# Patient Record
Sex: MALE | Race: WHITE | Employment: FULL TIME | URBAN - METROPOLITAN AREA
[De-identification: names, ages, dates, MRNs, and addresses within clinical notes are randomized per-mention and may not be internally consistent; named-entity substitution may affect disease eponyms.]

---

## 2017-03-10 RX ORDER — CLONAZEPAM 1 MG/1
TABLET ORAL
Qty: 90 TABLET | Refills: 3 | Status: SHIPPED
Start: 2017-03-10 | End: 2017-09-18

## 2017-03-10 NOTE — TELEPHONE ENCOUNTER
To : The above refill request is for a controlled substance. Please indicate yes or no to refill 30 days supply plus one refill.   If more refills are appropriate, please indicate quantity  The Rx from 2016  after 6 months

## 2017-05-26 RX ORDER — SILODOSIN 8 MG/1
CAPSULE ORAL
Qty: 90 CAPSULE | Refills: 1 | Status: SHIPPED | OUTPATIENT
Start: 2017-05-26 | End: 2017-12-31

## 2017-08-15 ENCOUNTER — TELEPHONE (OUTPATIENT)
Dept: INTERNAL MEDICINE CLINIC | Facility: CLINIC | Age: 52
End: 2017-08-15

## 2017-08-15 DIAGNOSIS — E78.00 HYPERCHOLESTEROLEMIA: ICD-10-CM

## 2017-08-15 DIAGNOSIS — Z12.5 SCREENING FOR PROSTATE CANCER: ICD-10-CM

## 2017-08-15 DIAGNOSIS — I10 BENIGN ESSENTIAL HYPERTENSION: Primary | ICD-10-CM

## 2017-08-15 DIAGNOSIS — Z00.00 ANNUAL PHYSICAL EXAM: ICD-10-CM

## 2017-08-15 NOTE — TELEPHONE ENCOUNTER
Pt has an kelsi for Yearly Physical on 9/27 he would like an order for labs put in system   Tasked to nursing

## 2017-08-25 RX ORDER — CELECOXIB 200 MG/1
CAPSULE ORAL
Qty: 90 CAPSULE | Refills: 3 | Status: SHIPPED | OUTPATIENT
Start: 2017-08-25 | End: 2018-09-09

## 2017-09-18 RX ORDER — CLONAZEPAM 1 MG/1
TABLET ORAL
Qty: 90 TABLET | Refills: 3 | Status: SHIPPED
Start: 2017-09-18 | End: 2018-03-22

## 2017-09-20 ENCOUNTER — APPOINTMENT (OUTPATIENT)
Dept: LAB | Age: 52
End: 2017-09-20
Attending: INTERNAL MEDICINE
Payer: COMMERCIAL

## 2017-09-20 LAB
ALBUMIN SERPL BCP-MCNC: 4.2 G/DL (ref 3.5–4.8)
ALBUMIN/GLOB SERPL: 1.7 {RATIO} (ref 1–2)
ALP SERPL-CCNC: 54 U/L (ref 32–100)
ALT SERPL-CCNC: 19 U/L (ref 17–63)
ANION GAP SERPL CALC-SCNC: 6 MMOL/L (ref 0–18)
AST SERPL-CCNC: 22 U/L (ref 15–41)
BASOPHILS # BLD: 0.1 K/UL (ref 0–0.2)
BASOPHILS NFR BLD: 1 %
BILIRUB SERPL-MCNC: 0.9 MG/DL (ref 0.3–1.2)
BUN SERPL-MCNC: 14 MG/DL (ref 8–20)
BUN/CREAT SERPL: 11.2 (ref 10–20)
CALCIUM SERPL-MCNC: 9.5 MG/DL (ref 8.5–10.5)
CHLORIDE SERPL-SCNC: 106 MMOL/L (ref 95–110)
CHOLEST SERPL-MCNC: 212 MG/DL (ref 110–200)
CO2 SERPL-SCNC: 27 MMOL/L (ref 22–32)
CREAT SERPL-MCNC: 1.25 MG/DL (ref 0.5–1.5)
EOSINOPHIL # BLD: 0.1 K/UL (ref 0–0.7)
EOSINOPHIL NFR BLD: 2 %
ERYTHROCYTE [DISTWIDTH] IN BLOOD BY AUTOMATED COUNT: 13.3 % (ref 11–15)
GLOBULIN PLAS-MCNC: 2.5 G/DL (ref 2.5–3.7)
GLUCOSE SERPL-MCNC: 97 MG/DL (ref 70–99)
HCT VFR BLD AUTO: 41.8 % (ref 41–52)
HDLC SERPL-MCNC: 61 MG/DL
HGB BLD-MCNC: 14.5 G/DL (ref 13.5–17.5)
LDLC SERPL CALC-MCNC: 136 MG/DL (ref 0–99)
LYMPHOCYTES # BLD: 1.5 K/UL (ref 1–4)
LYMPHOCYTES NFR BLD: 27 %
MCH RBC QN AUTO: 33.6 PG (ref 27–32)
MCHC RBC AUTO-ENTMCNC: 34.7 G/DL (ref 32–37)
MCV RBC AUTO: 96.9 FL (ref 80–100)
MONOCYTES # BLD: 0.3 K/UL (ref 0–1)
MONOCYTES NFR BLD: 5 %
NEUTROPHILS # BLD AUTO: 3.6 K/UL (ref 1.8–7.7)
NEUTROPHILS NFR BLD: 65 %
NONHDLC SERPL-MCNC: 151 MG/DL
OSMOLALITY UR CALC.SUM OF ELEC: 288 MOSM/KG (ref 275–295)
PLATELET # BLD AUTO: 213 K/UL (ref 140–400)
PMV BLD AUTO: 8.5 FL (ref 7.4–10.3)
POTASSIUM SERPL-SCNC: 4.7 MMOL/L (ref 3.3–5.1)
PROT SERPL-MCNC: 6.7 G/DL (ref 5.9–8.4)
PSA SERPL-MCNC: 0.4 NG/ML (ref 0–4)
RBC # BLD AUTO: 4.31 M/UL (ref 4.5–5.9)
SODIUM SERPL-SCNC: 139 MMOL/L (ref 136–144)
TRIGL SERPL-MCNC: 76 MG/DL (ref 1–149)
TSH SERPL-ACNC: 5.95 UIU/ML (ref 0.45–5.33)
WBC # BLD AUTO: 5.6 K/UL (ref 4–11)

## 2017-09-20 PROCEDURE — 85025 COMPLETE CBC W/AUTO DIFF WBC: CPT | Performed by: INTERNAL MEDICINE

## 2017-09-20 PROCEDURE — 80053 COMPREHEN METABOLIC PANEL: CPT | Performed by: INTERNAL MEDICINE

## 2017-09-20 PROCEDURE — 84443 ASSAY THYROID STIM HORMONE: CPT | Performed by: INTERNAL MEDICINE

## 2017-09-20 PROCEDURE — 80061 LIPID PANEL: CPT | Performed by: INTERNAL MEDICINE

## 2017-09-27 ENCOUNTER — OFFICE VISIT (OUTPATIENT)
Dept: INTERNAL MEDICINE CLINIC | Facility: CLINIC | Age: 52
End: 2017-09-27

## 2017-09-27 VITALS
TEMPERATURE: 98 F | HEIGHT: 76 IN | DIASTOLIC BLOOD PRESSURE: 84 MMHG | WEIGHT: 222.63 LBS | OXYGEN SATURATION: 98 % | SYSTOLIC BLOOD PRESSURE: 114 MMHG | HEART RATE: 79 BPM | BODY MASS INDEX: 27.11 KG/M2

## 2017-09-27 DIAGNOSIS — D03.8 MELANOMA IN SITU OF OTHER SITE (HCC): ICD-10-CM

## 2017-09-27 DIAGNOSIS — Z00.00 ANNUAL PHYSICAL EXAM: Primary | ICD-10-CM

## 2017-09-27 DIAGNOSIS — N40.1 BENIGN NON-NODULAR PROSTATIC HYPERPLASIA WITH LOWER URINARY TRACT SYMPTOMS: ICD-10-CM

## 2017-09-27 DIAGNOSIS — G25.81 RLS (RESTLESS LEGS SYNDROME): ICD-10-CM

## 2017-09-27 DIAGNOSIS — E78.00 HYPERCHOLESTEROLEMIA: ICD-10-CM

## 2017-09-27 DIAGNOSIS — M54.12 CERVICAL RADICULOPATHY: ICD-10-CM

## 2017-09-27 DIAGNOSIS — F51.01 PRIMARY INSOMNIA: ICD-10-CM

## 2017-09-27 PROBLEM — D03.9 MELANOMA IN SITU (HCC): Status: ACTIVE | Noted: 2017-09-27

## 2017-09-27 PROCEDURE — 99396 PREV VISIT EST AGE 40-64: CPT | Performed by: INTERNAL MEDICINE

## 2017-09-27 RX ORDER — PRAMIPEXOLE DIHYDROCHLORIDE 0.12 MG/1
TABLET ORAL
Qty: 21 TABLET | Refills: 0 | Status: SHIPPED | OUTPATIENT
Start: 2017-09-27 | End: 2018-03-16

## 2017-09-27 RX ORDER — PRAMIPEXOLE DIHYDROCHLORIDE 0.5 MG/1
0.5 TABLET ORAL NIGHTLY
Qty: 90 TABLET | Refills: 3 | Status: SHIPPED | OUTPATIENT
Start: 2017-09-27 | End: 2018-03-16

## 2017-09-27 NOTE — PROGRESS NOTES
Aviva Todd is a 47year old male. HPI:   Patient presents with:  Physical: annual PE  Medication Follow-Up: questiond/concerns about Lorazepam for restless leg syndrome. He Doesn't think it's helping anymore.       48 y/o M here for physical e External Cream Apply 1 Application topically as needed.  Disp:  Rfl: 2   NASONEX 50 MCG/ACT Nasal Suspension SPRAY 2 SPRAYS INTO BOTH NOSTRILS DAILY AS NEEDED Disp: 1 Inhaler Rfl: 1       Allergies:  No Known Allergies            ROS:   Constitutional: no w exam, annual  PSA normal at 0.4 in Sept 2017; sees dermatologist Dr Charity Blanton 3-4x/year for MARTHA; pt seen by GI Dr Ale Pro for screening colonoscopy in Nov 2016; he colonoscopy revealed one hyperplastic polyp; based on his history, the patient was advised to

## 2017-11-01 ENCOUNTER — HOSPITAL ENCOUNTER (OUTPATIENT)
Dept: CV DIAGNOSTICS | Facility: HOSPITAL | Age: 52
Discharge: HOME | End: 2017-11-01
Attending: INTERNAL MEDICINE
Payer: COMMERCIAL

## 2017-11-01 DIAGNOSIS — R94.31 ABNORMAL EKG: ICD-10-CM

## 2017-11-01 DIAGNOSIS — Z01.818 PREOP EXAMINATION: ICD-10-CM

## 2017-11-01 PROCEDURE — 93016 CV STRESS TEST SUPVJ ONLY: CPT | Performed by: INTERNAL MEDICINE

## 2017-11-01 PROCEDURE — 93017 CV STRESS TEST TRACING ONLY: CPT | Performed by: INTERNAL MEDICINE

## 2017-11-01 PROCEDURE — 93350 STRESS TTE ONLY: CPT | Performed by: INTERNAL MEDICINE

## 2017-11-01 PROCEDURE — 93018 CV STRESS TEST I&R ONLY: CPT | Performed by: INTERNAL MEDICINE

## 2018-01-02 RX ORDER — SILODOSIN 8 MG/1
CAPSULE ORAL
Qty: 90 CAPSULE | Refills: 3 | Status: SHIPPED | OUTPATIENT
Start: 2018-01-02 | End: 2018-12-11

## 2018-03-16 ENCOUNTER — OFFICE VISIT (OUTPATIENT)
Dept: INTERNAL MEDICINE CLINIC | Facility: CLINIC | Age: 53
End: 2018-03-16

## 2018-03-16 VITALS
WEIGHT: 220 LBS | SYSTOLIC BLOOD PRESSURE: 130 MMHG | DIASTOLIC BLOOD PRESSURE: 76 MMHG | TEMPERATURE: 98 F | OXYGEN SATURATION: 98 % | HEIGHT: 76 IN | HEART RATE: 60 BPM | BODY MASS INDEX: 26.79 KG/M2

## 2018-03-16 DIAGNOSIS — J01.10 ACUTE FRONTAL SINUSITIS, RECURRENCE NOT SPECIFIED: Primary | ICD-10-CM

## 2018-03-16 PROCEDURE — 99213 OFFICE O/P EST LOW 20 MIN: CPT | Performed by: INTERNAL MEDICINE

## 2018-03-16 PROCEDURE — 99212 OFFICE O/P EST SF 10 MIN: CPT | Performed by: INTERNAL MEDICINE

## 2018-03-16 RX ORDER — CEFUROXIME AXETIL 500 MG/1
500 TABLET ORAL 2 TIMES DAILY
Qty: 28 TABLET | Refills: 0 | Status: SHIPPED | OUTPATIENT
Start: 2018-03-16 | End: 2018-09-14

## 2018-03-16 NOTE — PROGRESS NOTES
Xenia Molina is a 47year old male. HPI:   Patient presents with:  Sinus Problem: Pt here today for facial pain, congestion/stuffy nose, was blowing out green-bloody mucus which since has subsided. URI / Head Cold that started 11 days ago.  Tried N Allergies              ROS:   Constitutional: no weight loss; no fatigue  Cardiovascular:  Negative for chest pain; negative palpitations  Respiratory:  Negative for cough, dyspnea and wheezing  Gastrointestinal:  Negative for abdominal pain, constipation,

## 2018-03-22 NOTE — TELEPHONE ENCOUNTER
To MD:  The above refill request is for a controlled substance. Please indicate yes or no to refill 30 days supply plus one refill.   If more refills are appropriate, please indicate quantity      Last refilled 9/18/2017 # 90/3

## 2018-03-23 RX ORDER — CLONAZEPAM 1 MG/1
TABLET ORAL
Qty: 90 TABLET | Refills: 3 | Status: SHIPPED
Start: 2018-03-23 | End: 2018-09-24

## 2018-04-05 ENCOUNTER — TELEPHONE (OUTPATIENT)
Dept: INTERNAL MEDICINE CLINIC | Facility: CLINIC | Age: 53
End: 2018-04-05

## 2018-04-05 NOTE — TELEPHONE ENCOUNTER
Pt. Still has a sinus infection & headache he wants to know if he can be prescribed something he completed two round of antibiotics.  Please advise  Ph. # [phone]    [facility] # [phone]    Routed to clinical

## 2018-04-05 NOTE — TELEPHONE ENCOUNTER
Please advise - called patient who still has sinus problems ( he completed 2 rounds of antibiotics. He has a headache , pressure behind right eye and in his ear. Last week while flying the ear was very painful, denies other SX , denies fever - to DR. MITTAL

## 2018-04-06 RX ORDER — SULFAMETHOXAZOLE AND TRIMETHOPRIM 800; 160 MG/1; MG/1
1 TABLET ORAL 2 TIMES DAILY
Qty: 20 TABLET | Refills: 0 | Status: SHIPPED | OUTPATIENT
Start: 2018-04-06 | End: 2018-09-14

## 2018-07-12 ENCOUNTER — OFFICE VISIT (OUTPATIENT)
Dept: INTERNAL MEDICINE CLINIC | Facility: CLINIC | Age: 53
End: 2018-07-12

## 2018-07-12 VITALS
HEART RATE: 78 BPM | WEIGHT: 225 LBS | RESPIRATION RATE: 18 BRPM | SYSTOLIC BLOOD PRESSURE: 126 MMHG | HEIGHT: 76 IN | OXYGEN SATURATION: 97 % | DIASTOLIC BLOOD PRESSURE: 70 MMHG | TEMPERATURE: 98 F | BODY MASS INDEX: 27.4 KG/M2

## 2018-07-12 DIAGNOSIS — F51.01 PRIMARY INSOMNIA: ICD-10-CM

## 2018-07-12 DIAGNOSIS — G25.81 RLS (RESTLESS LEGS SYNDROME): ICD-10-CM

## 2018-07-12 DIAGNOSIS — F41.9 ANXIETY: ICD-10-CM

## 2018-07-12 DIAGNOSIS — R53.83 OTHER FATIGUE: Primary | ICD-10-CM

## 2018-07-12 PROCEDURE — 99212 OFFICE O/P EST SF 10 MIN: CPT | Performed by: INTERNAL MEDICINE

## 2018-07-12 PROCEDURE — 99214 OFFICE O/P EST MOD 30 MIN: CPT | Performed by: INTERNAL MEDICINE

## 2018-07-12 RX ORDER — DIAZEPAM 5 MG/1
5 TABLET ORAL 2 TIMES DAILY PRN
Qty: 60 TABLET | Refills: 3 | Status: SHIPPED
Start: 2018-07-12

## 2018-07-12 NOTE — PROGRESS NOTES
Darnell Manriquez is a 47year old male.     HPI:   Patient presents with:  Medication Request: questions regarding Clonazepam and possible change on rx      48 y/o M here with c/o +fatigue and anxiety; has had intermittent panic-related anxiety; changing Cefuroxime Axetil 500 MG Oral Tab Take 1 tablet (500 mg total) by mouth 2 (two) times daily.  Disp: 28 tablet Rfl: 0   NASONEX 50 MCG/ACT Nasal Suspension SPRAY 2 SPRAYS INTO BOTH NOSTRILS DAILY AS NEEDED Disp: 1 Inhaler Rfl: 1       Allergies:  No Known Nasonex NS 2 sp EN qD     Hypercholesteremia   in Sept 2017; advise diet- control     Melanoma  S/p excision to left lower back in Aug 2016; F/U dermatologist Dr Kendrick Gill q 3 mos     PACs  ECHO 55-60% in April 2016; saw EP Dr Janette Elkins     RBBB  EKG to

## 2018-08-30 ENCOUNTER — PATIENT MESSAGE (OUTPATIENT)
Dept: INTERNAL MEDICINE CLINIC | Facility: CLINIC | Age: 53
End: 2018-08-30

## 2018-08-30 DIAGNOSIS — E29.1 HYPOGONADISM IN MALE: Primary | ICD-10-CM

## 2018-08-30 DIAGNOSIS — Z00.00 PHYSICAL EXAM, ANNUAL: ICD-10-CM

## 2018-08-30 DIAGNOSIS — Z12.5 SCREENING PSA (PROSTATE SPECIFIC ANTIGEN): ICD-10-CM

## 2018-08-30 NOTE — TELEPHONE ENCOUNTER
From: Terrance Hamm  To: Deya Rea MD  Sent: 8/30/2018 11:17 AM CDT  Subject: Non-Urgent Medical Question    I would like to have my testosterone level checked in my upcoming physical exam. Is that part of the normal blood test or do I need t

## 2018-09-09 RX ORDER — CELECOXIB 200 MG/1
CAPSULE ORAL
Qty: 90 CAPSULE | Refills: 3 | Status: SHIPPED | OUTPATIENT
Start: 2018-09-09 | End: 2019-08-22

## 2018-09-14 RX ORDER — PRAMIPEXOLE DIHYDROCHLORIDE 0.5 MG/1
0.5 TABLET ORAL NIGHTLY
Qty: 90 TABLET | Refills: 3 | OUTPATIENT
Start: 2018-09-14

## 2018-09-14 NOTE — TELEPHONE ENCOUNTER
Pt is no longer on med  Refill request has failed the Ambulatory Medication Refill Standing Order and is routed to the primary physician to review the following:

## 2018-09-19 ENCOUNTER — LAB ENCOUNTER (OUTPATIENT)
Dept: LAB | Age: 53
End: 2018-09-19
Attending: INTERNAL MEDICINE
Payer: COMMERCIAL

## 2018-09-19 DIAGNOSIS — Z00.00 PHYSICAL EXAM, ANNUAL: ICD-10-CM

## 2018-09-19 DIAGNOSIS — Z12.5 SCREENING PSA (PROSTATE SPECIFIC ANTIGEN): ICD-10-CM

## 2018-09-19 DIAGNOSIS — E29.1 HYPOGONADISM IN MALE: ICD-10-CM

## 2018-09-19 LAB
ALBUMIN SERPL BCP-MCNC: 4.1 G/DL (ref 3.5–4.8)
ALBUMIN/GLOB SERPL: 2.1 {RATIO} (ref 1–2)
ALP SERPL-CCNC: 40 U/L (ref 32–100)
ALT SERPL-CCNC: 24 U/L (ref 17–63)
ANION GAP SERPL CALC-SCNC: 6 MMOL/L (ref 0–18)
AST SERPL-CCNC: 24 U/L (ref 15–41)
BASOPHILS # BLD: 0 K/UL (ref 0–0.2)
BASOPHILS NFR BLD: 1 %
BILIRUB SERPL-MCNC: 1.2 MG/DL (ref 0.3–1.2)
BUN SERPL-MCNC: 12 MG/DL (ref 8–20)
BUN/CREAT SERPL: 10.3 (ref 10–20)
CALCIUM SERPL-MCNC: 9.2 MG/DL (ref 8.5–10.5)
CHLORIDE SERPL-SCNC: 107 MMOL/L (ref 95–110)
CHOLEST SERPL-MCNC: 207 MG/DL (ref 110–200)
CO2 SERPL-SCNC: 26 MMOL/L (ref 22–32)
CREAT SERPL-MCNC: 1.17 MG/DL (ref 0.5–1.5)
EOSINOPHIL # BLD: 0.1 K/UL (ref 0–0.7)
EOSINOPHIL NFR BLD: 3 %
ERYTHROCYTE [DISTWIDTH] IN BLOOD BY AUTOMATED COUNT: 14 % (ref 11–15)
GLOBULIN PLAS-MCNC: 2 G/DL (ref 2.5–3.7)
GLUCOSE SERPL-MCNC: 90 MG/DL (ref 70–99)
HCT VFR BLD AUTO: 41 % (ref 41–52)
HDLC SERPL-MCNC: 53 MG/DL
HGB BLD-MCNC: 14.3 G/DL (ref 13.5–17.5)
LDLC SERPL CALC-MCNC: 140 MG/DL (ref 0–99)
LYMPHOCYTES # BLD: 1.4 K/UL (ref 1–4)
LYMPHOCYTES NFR BLD: 36 %
MCH RBC QN AUTO: 34 PG (ref 27–32)
MCHC RBC AUTO-ENTMCNC: 34.9 G/DL (ref 32–37)
MCV RBC AUTO: 97.5 FL (ref 80–100)
MONOCYTES # BLD: 0.3 K/UL (ref 0–1)
MONOCYTES NFR BLD: 8 %
NEUTROPHILS # BLD AUTO: 2.1 K/UL (ref 1.8–7.7)
NEUTROPHILS NFR BLD: 53 %
NONHDLC SERPL-MCNC: 154 MG/DL
OSMOLALITY UR CALC.SUM OF ELEC: 287 MOSM/KG (ref 275–295)
PATIENT FASTING: YES
PLATELET # BLD AUTO: 215 K/UL (ref 140–400)
PMV BLD AUTO: 8.9 FL (ref 7.4–10.3)
POTASSIUM SERPL-SCNC: 4.3 MMOL/L (ref 3.3–5.1)
PROT SERPL-MCNC: 6.1 G/DL (ref 5.9–8.4)
PSA SERPL-MCNC: 0.4 NG/ML (ref 0–4)
RBC # BLD AUTO: 4.2 M/UL (ref 4.5–5.9)
SODIUM SERPL-SCNC: 139 MMOL/L (ref 136–144)
TESTOST SERPL-MCNC: 519 NG/DL (ref 175–781)
TRIGL SERPL-MCNC: 68 MG/DL (ref 1–149)
TSH SERPL-ACNC: 3.8 UIU/ML (ref 0.45–5.33)
WBC # BLD AUTO: 4 K/UL (ref 4–11)

## 2018-09-19 PROCEDURE — 80053 COMPREHEN METABOLIC PANEL: CPT

## 2018-09-19 PROCEDURE — 84443 ASSAY THYROID STIM HORMONE: CPT

## 2018-09-19 PROCEDURE — 36415 COLL VENOUS BLD VENIPUNCTURE: CPT

## 2018-09-19 PROCEDURE — 84403 ASSAY OF TOTAL TESTOSTERONE: CPT

## 2018-09-19 PROCEDURE — 80061 LIPID PANEL: CPT

## 2018-09-19 PROCEDURE — 85025 COMPLETE CBC W/AUTO DIFF WBC: CPT

## 2018-09-25 NOTE — TELEPHONE ENCOUNTER
To MD:  The above refill request is for a controlled substance. Please indicate yes or no to refill 30 days supply plus one refill.   If more refills are appropriate, please indicate quantity    Last refilled- 3/23/2018 # 90/1

## 2018-09-27 RX ORDER — CLONAZEPAM 1 MG/1
TABLET ORAL
Qty: 90 TABLET | Refills: 2 | Status: SHIPPED
Start: 2018-09-27 | End: 2019-03-20

## 2018-10-26 ENCOUNTER — OFFICE VISIT (OUTPATIENT)
Dept: INTERNAL MEDICINE CLINIC | Facility: CLINIC | Age: 53
End: 2018-10-26
Payer: COMMERCIAL

## 2018-10-26 VITALS
SYSTOLIC BLOOD PRESSURE: 104 MMHG | TEMPERATURE: 99 F | BODY MASS INDEX: 27.4 KG/M2 | HEIGHT: 76 IN | WEIGHT: 225 LBS | DIASTOLIC BLOOD PRESSURE: 80 MMHG | HEART RATE: 76 BPM

## 2018-10-26 DIAGNOSIS — G25.81 RLS (RESTLESS LEGS SYNDROME): ICD-10-CM

## 2018-10-26 DIAGNOSIS — I45.10 RBBB: ICD-10-CM

## 2018-10-26 DIAGNOSIS — I49.1 PAC (PREMATURE ATRIAL CONTRACTION): ICD-10-CM

## 2018-10-26 DIAGNOSIS — J30.89 ALLERGIC RHINITIS DUE TO OTHER ALLERGIC TRIGGER, UNSPECIFIED SEASONALITY: ICD-10-CM

## 2018-10-26 DIAGNOSIS — M17.12 LOCALIZED OSTEOARTHRITIS OF LEFT KNEE: ICD-10-CM

## 2018-10-26 DIAGNOSIS — E78.00 HYPERCHOLESTEREMIA: ICD-10-CM

## 2018-10-26 DIAGNOSIS — D03.8 MELANOMA IN SITU OF OTHER SITE (HCC): ICD-10-CM

## 2018-10-26 DIAGNOSIS — F41.9 ANXIETY: ICD-10-CM

## 2018-10-26 DIAGNOSIS — Z00.00 PHYSICAL EXAM, ANNUAL: Primary | ICD-10-CM

## 2018-10-26 DIAGNOSIS — M54.12 CERVICAL RADICULOPATHY: ICD-10-CM

## 2018-10-26 DIAGNOSIS — N40.1 BENIGN NON-NODULAR PROSTATIC HYPERPLASIA WITH LOWER URINARY TRACT SYMPTOMS: ICD-10-CM

## 2018-10-26 DIAGNOSIS — F51.01 PRIMARY INSOMNIA: ICD-10-CM

## 2018-10-26 PROCEDURE — 90686 IIV4 VACC NO PRSV 0.5 ML IM: CPT | Performed by: INTERNAL MEDICINE

## 2018-10-26 PROCEDURE — 90715 TDAP VACCINE 7 YRS/> IM: CPT | Performed by: INTERNAL MEDICINE

## 2018-10-26 PROCEDURE — 90472 IMMUNIZATION ADMIN EACH ADD: CPT | Performed by: INTERNAL MEDICINE

## 2018-10-26 PROCEDURE — 99396 PREV VISIT EST AGE 40-64: CPT | Performed by: INTERNAL MEDICINE

## 2018-10-26 PROCEDURE — 90471 IMMUNIZATION ADMIN: CPT | Performed by: INTERNAL MEDICINE

## 2018-10-26 RX ORDER — NITROGLYCERIN 2 %
1 OINTMENT (GRAM) TRANSDERMAL 2 TIMES DAILY
Refills: 1 | COMMUNITY
Start: 2018-08-24

## 2018-10-26 NOTE — PROGRESS NOTES
Roly Taveras is a 47year old male.     HPI:   Patient presents with:  Physical      46 y/o M here for physical exam; has had occasional right sinus headaches;  no CP; no SOB; no headaches; no palpitation; anxiety has lessened with less lob stress DAILY AS NEEDED Disp: 1 Inhaler Rfl: 1       Allergies:  No Known Allergies            ROS:   Constitutional: no weight loss; no fatigue  ENMT:  Negative for ear drainage, hearing loss and nasal drainage  Eyes:  Negative for eye discharge and vision loss colonoscopy revealed one hyperplastic polyp; based on his history, the patient was advised to repeat colonoscopy in 5 years (or Nov 2021) due to the poor - fair quality of his bowel prep;  Tdap today, and flu vax today    Anxiety  Does not want SSRI; on stephanie

## 2018-12-13 RX ORDER — SILODOSIN 8 MG/1
CAPSULE ORAL
Qty: 90 CAPSULE | Refills: 3 | Status: SHIPPED | OUTPATIENT
Start: 2018-12-13 | End: 2019-11-08

## 2018-12-13 NOTE — TELEPHONE ENCOUNTER
Refill request is for a maintenance medication and has met the criteria specified in the Ambulatory Medication Refill Standing Order for eligibility, visits, laboratory, alerts and was sent to the requested pharmacy.     Requested Prescriptions     Pending

## 2019-03-19 ENCOUNTER — TELEPHONE (OUTPATIENT)
Dept: INTERNAL MEDICINE CLINIC | Facility: CLINIC | Age: 54
End: 2019-03-19

## 2019-03-19 NOTE — TELEPHONE ENCOUNTER
Patient is due for a refill for Clonazepam 1mg  - daily - #90 on 3/25. He is leaving for out of town on 3/23 - needs this done by 3/22, so pharmacist said he needed our approval for early fill.     [facility]

## 2019-03-20 RX ORDER — CLONAZEPAM 1 MG/1
TABLET ORAL
Qty: 90 TABLET | Refills: 3 | Status: SHIPPED
Start: 2019-03-20 | End: 2019-09-18

## 2019-03-21 NOTE — TELEPHONE ENCOUNTER
Imp- restless leg syndrome; Rec- refilled clonazepam 1 mg po qHS; #90, 3RF; may refill early;  Rx FAXed; please notify pt Rx was FAXed

## 2019-05-15 ENCOUNTER — OFFICE VISIT (OUTPATIENT)
Dept: INTERNAL MEDICINE CLINIC | Facility: CLINIC | Age: 54
End: 2019-05-15
Payer: COMMERCIAL

## 2019-05-15 VITALS
HEIGHT: 76 IN | TEMPERATURE: 98 F | WEIGHT: 225 LBS | DIASTOLIC BLOOD PRESSURE: 84 MMHG | OXYGEN SATURATION: 98 % | HEART RATE: 66 BPM | SYSTOLIC BLOOD PRESSURE: 124 MMHG | BODY MASS INDEX: 27.4 KG/M2

## 2019-05-15 DIAGNOSIS — N40.1 BENIGN NON-NODULAR PROSTATIC HYPERPLASIA WITH LOWER URINARY TRACT SYMPTOMS: ICD-10-CM

## 2019-05-15 DIAGNOSIS — M17.12 LOCALIZED OSTEOARTHRITIS OF LEFT KNEE: ICD-10-CM

## 2019-05-15 DIAGNOSIS — G43.809 OTHER MIGRAINE WITHOUT STATUS MIGRAINOSUS, NOT INTRACTABLE: Primary | ICD-10-CM

## 2019-05-15 DIAGNOSIS — G25.81 RLS (RESTLESS LEGS SYNDROME): ICD-10-CM

## 2019-05-15 PROCEDURE — 99214 OFFICE O/P EST MOD 30 MIN: CPT | Performed by: INTERNAL MEDICINE

## 2019-05-15 PROCEDURE — 99212 OFFICE O/P EST SF 10 MIN: CPT | Performed by: INTERNAL MEDICINE

## 2019-05-15 RX ORDER — SUMATRIPTAN 50 MG/1
50 TABLET, FILM COATED ORAL EVERY 2 HOUR PRN
Qty: 9 TABLET | Refills: 5 | Status: SHIPPED | OUTPATIENT
Start: 2019-05-15 | End: 2019-09-18

## 2019-05-15 NOTE — PROGRESS NOTES
Lamonte Acuña is a 47year old male. HPI:   Patient presents with:  Headache: Patient reports he has been getting headaches for the past couple of years and they have increased in frequency. He thinks they may be migraines.  He states they are all t Alcohol use: Yes      Comment: occasionally.  3-4 drinks a month    Drug use: No       Medications (Active prior to today's visit):    Current Outpatient Medications:  SUMAtriptan Succinate 50 MG Oral Tab Take 1 tablet (50 mg total) by mouth every 2 (two) retro-orbital headaches with +photsensitivity and +throbbing quality; suspect migraine variant; advise trail sumatriptan 50 mg po qD prn; if fails to respond, would consider MRA brain    Health Maintenance  PSA normal at 0.4 in YBON 2051; sees dermatologis

## 2019-08-22 ENCOUNTER — TELEPHONE (OUTPATIENT)
Dept: INTERNAL MEDICINE CLINIC | Facility: CLINIC | Age: 54
End: 2019-08-22

## 2019-08-22 DIAGNOSIS — Z00.00 ANNUAL PHYSICAL EXAM: Primary | ICD-10-CM

## 2019-08-22 RX ORDER — CELECOXIB 200 MG/1
CAPSULE ORAL
Qty: 90 CAPSULE | Refills: 0 | Status: SHIPPED | OUTPATIENT
Start: 2019-08-22 | End: 2019-11-08

## 2019-08-23 NOTE — TELEPHONE ENCOUNTER
Pt is scheduled on Nov 8 for a yearly physical pt is requesting a lab order be entered in system   Tasked to nursing

## 2019-09-19 RX ORDER — CLONAZEPAM 1 MG/1
TABLET ORAL
Qty: 90 TABLET | Refills: 3 | Status: SHIPPED | OUTPATIENT
Start: 2019-09-19 | End: 2020-06-14

## 2019-09-19 RX ORDER — SUMATRIPTAN 50 MG/1
TABLET, FILM COATED ORAL
Qty: 9 TABLET | Refills: 5 | Status: SHIPPED | OUTPATIENT
Start: 2019-09-19 | End: 2020-02-24

## 2019-09-19 NOTE — TELEPHONE ENCOUNTER
To Dr. Reginald Jett - see pended. Spoke with pharmacy - pt has used all refills for sumatriptan - increase #? RF? Clonazepam expires after 6 months. To MD:  The above refill request is for a controlled substance. Please review pended medication order.

## 2019-11-04 ENCOUNTER — LAB ENCOUNTER (OUTPATIENT)
Dept: LAB | Age: 54
End: 2019-11-04
Attending: INTERNAL MEDICINE
Payer: COMMERCIAL

## 2019-11-04 DIAGNOSIS — Z00.00 ANNUAL PHYSICAL EXAM: ICD-10-CM

## 2019-11-04 PROCEDURE — 84443 ASSAY THYROID STIM HORMONE: CPT

## 2019-11-04 PROCEDURE — 80061 LIPID PANEL: CPT

## 2019-11-04 PROCEDURE — 36415 COLL VENOUS BLD VENIPUNCTURE: CPT

## 2019-11-04 PROCEDURE — 80053 COMPREHEN METABOLIC PANEL: CPT

## 2019-11-04 PROCEDURE — 85025 COMPLETE CBC W/AUTO DIFF WBC: CPT

## 2019-11-04 PROCEDURE — 84439 ASSAY OF FREE THYROXINE: CPT

## 2019-11-04 NOTE — TELEPHONE ENCOUNTER
Patient presented for labs. No orders in the system. Lab orders entered per protocol.     MARLYN Brizuela Notice

## 2019-11-08 ENCOUNTER — OFFICE VISIT (OUTPATIENT)
Dept: INTERNAL MEDICINE CLINIC | Facility: CLINIC | Age: 54
End: 2019-11-08
Payer: COMMERCIAL

## 2019-11-08 VITALS
RESPIRATION RATE: 16 BRPM | TEMPERATURE: 98 F | WEIGHT: 230 LBS | DIASTOLIC BLOOD PRESSURE: 78 MMHG | BODY MASS INDEX: 28.01 KG/M2 | OXYGEN SATURATION: 98 % | SYSTOLIC BLOOD PRESSURE: 118 MMHG | HEART RATE: 64 BPM | HEIGHT: 76 IN

## 2019-11-08 DIAGNOSIS — F41.9 ANXIETY: ICD-10-CM

## 2019-11-08 DIAGNOSIS — G25.81 RLS (RESTLESS LEGS SYNDROME): ICD-10-CM

## 2019-11-08 DIAGNOSIS — I49.1 PAC (PREMATURE ATRIAL CONTRACTION): ICD-10-CM

## 2019-11-08 DIAGNOSIS — R51.9 HEADACHE DISORDER: ICD-10-CM

## 2019-11-08 DIAGNOSIS — N40.1 BENIGN NON-NODULAR PROSTATIC HYPERPLASIA WITH LOWER URINARY TRACT SYMPTOMS: ICD-10-CM

## 2019-11-08 DIAGNOSIS — J30.89 ALLERGIC RHINITIS DUE TO OTHER ALLERGIC TRIGGER, UNSPECIFIED SEASONALITY: ICD-10-CM

## 2019-11-08 DIAGNOSIS — D03.8 MELANOMA IN SITU OF OTHER SITE (HCC): ICD-10-CM

## 2019-11-08 DIAGNOSIS — F51.01 PRIMARY INSOMNIA: ICD-10-CM

## 2019-11-08 DIAGNOSIS — Z00.00 ANNUAL PHYSICAL EXAM: Primary | ICD-10-CM

## 2019-11-08 DIAGNOSIS — I45.10 RBBB: ICD-10-CM

## 2019-11-08 DIAGNOSIS — M17.12 LOCALIZED OSTEOARTHRITIS OF LEFT KNEE: ICD-10-CM

## 2019-11-08 DIAGNOSIS — E78.00 HYPERCHOLESTEREMIA: ICD-10-CM

## 2019-11-08 PROCEDURE — 90686 IIV4 VACC NO PRSV 0.5 ML IM: CPT | Performed by: INTERNAL MEDICINE

## 2019-11-08 PROCEDURE — 90471 IMMUNIZATION ADMIN: CPT | Performed by: INTERNAL MEDICINE

## 2019-11-08 PROCEDURE — 99396 PREV VISIT EST AGE 40-64: CPT | Performed by: INTERNAL MEDICINE

## 2019-11-08 RX ORDER — SILODOSIN 8 MG/1
8 CAPSULE ORAL
Qty: 90 CAPSULE | Refills: 3 | Status: SHIPPED | OUTPATIENT
Start: 2019-11-08 | End: 2020-12-14

## 2019-11-08 RX ORDER — CELECOXIB 200 MG/1
200 CAPSULE ORAL
Qty: 90 CAPSULE | Refills: 3 | Status: SHIPPED | OUTPATIENT
Start: 2019-11-08

## 2019-11-08 NOTE — PROGRESS NOTES
Moriah Kulkarni is a 49year old male.     HPI:   Patient presents with:  Physical: Annual Px      49 yg/o M here for physical exam; has been taking sumatriptan 50 mg po qD prn headache; has been  taking 1-2x per month;  no CP; no SOB; no headaches; no p Application topically 2 (two) times daily. 1   • diazepam 5 MG Oral Tab Take 1 tablet (5 mg total) by mouth 2 (two) times daily as needed for Anxiety.  60 tablet 3   • NASONEX 50 MCG/ACT Nasal Suspension SPRAY 2 SPRAYS INTO BOTH NOSTRILS DAILY AS NEEDED 1 and proprioception intact  Skin: no rash or ulcerations         ASSESSMENT/PLAN:   Physical exam   PSA normal at 0.5 in PIM 1784; sees dermatologist Dr Sharyn Morrow 3-4x/year for MARTHA; pt seen by GI Dr Migdalia Duncan for screening colonoscopy in Nov 2016; he colonosco FREE 0.5 ML     11/8/2019  Cher Philip MD

## 2019-11-17 RX ORDER — CELECOXIB 200 MG/1
CAPSULE ORAL
Qty: 90 CAPSULE | Refills: 0 | OUTPATIENT
Start: 2019-11-17

## 2019-11-18 NOTE — TELEPHONE ENCOUNTER
Refill request is for a maintenance medication and has met the criteria specified in the Ambulatory Medication Refill Standing Order for eligibility, visits, laboratory, alerts and was sent to the requested pharmacy.     Requested Prescriptions     Refused

## 2019-11-19 NOTE — TELEPHONE ENCOUNTER
Pt does not remember getting a paper RX  He does need a refill &   [facility]

## 2019-11-20 RX ORDER — CELECOXIB 200 MG/1
CAPSULE ORAL
Qty: 90 CAPSULE | Refills: 3 | Status: SHIPPED | OUTPATIENT
Start: 2019-11-20 | End: 2020-12-04

## 2020-01-28 RX ORDER — SUMATRIPTAN 50 MG/1
TABLET, FILM COATED ORAL
Qty: 9 TABLET | Refills: 5 | OUTPATIENT
Start: 2020-01-28

## 2020-01-28 NOTE — TELEPHONE ENCOUNTER
Current refill request refused due to refill is either a duplicate request or has active refills at the pharmacy. Check previous templates.     Requested Prescriptions     Refused Prescriptions Disp Refills   • SUMATRIPTAN SUCCINATE 50 MG Oral Tab [Pharmac

## 2020-02-24 RX ORDER — SUMATRIPTAN 50 MG/1
TABLET, FILM COATED ORAL
Qty: 9 TABLET | Refills: 5 | Status: SHIPPED | OUTPATIENT
Start: 2020-02-24 | End: 2021-02-25

## 2020-06-13 ENCOUNTER — TELEPHONE (OUTPATIENT)
Dept: INTERNAL MEDICINE CLINIC | Facility: CLINIC | Age: 55
End: 2020-06-13

## 2020-06-15 RX ORDER — CLONAZEPAM 1 MG/1
TABLET ORAL
Qty: 90 TABLET | Refills: 1 | Status: SHIPPED | OUTPATIENT
Start: 2020-06-15 | End: 2020-12-14

## 2020-10-14 ENCOUNTER — IMMUNIZATION (OUTPATIENT)
Dept: INTERNAL MEDICINE CLINIC | Facility: CLINIC | Age: 55
End: 2020-10-14
Payer: COMMERCIAL

## 2020-10-14 DIAGNOSIS — Z23 NEED FOR VACCINATION: ICD-10-CM

## 2020-10-14 PROCEDURE — 90686 IIV4 VACC NO PRSV 0.5 ML IM: CPT | Performed by: INTERNAL MEDICINE

## 2020-10-14 PROCEDURE — 90471 IMMUNIZATION ADMIN: CPT | Performed by: INTERNAL MEDICINE

## 2020-12-01 ENCOUNTER — TELEPHONE (OUTPATIENT)
Dept: INTERNAL MEDICINE CLINIC | Facility: CLINIC | Age: 55
End: 2020-12-01

## 2020-12-03 NOTE — TELEPHONE ENCOUNTER
Patient prefers to wait to schedule    Plans to schedule as soon as vaccine is available  Will need refill - takes for his arthritis

## 2020-12-04 RX ORDER — CELECOXIB 200 MG/1
CAPSULE ORAL
Qty: 90 CAPSULE | Refills: 0 | Status: SHIPPED | OUTPATIENT
Start: 2020-12-04 | End: 2021-02-25

## 2020-12-04 NOTE — TELEPHONE ENCOUNTER
One refill done; To  - last OV/labs 11/2019 and pt prefers not to come in;  Do you want to schedule televisit?     Requested Prescriptions     Signed Prescriptions Disp Refills   • CELECOXIB 200 MG Oral Cap 90 capsule 0     Sig: TAKE ONE CAPSULE BY MO

## 2020-12-14 RX ORDER — CLONAZEPAM 1 MG/1
TABLET ORAL
Qty: 90 TABLET | Refills: 1 | Status: SHIPPED | OUTPATIENT
Start: 2020-12-14 | End: 2021-06-11

## 2020-12-14 RX ORDER — SILODOSIN 8 MG/1
8 CAPSULE ORAL
Qty: 90 CAPSULE | Refills: 0 | Status: SHIPPED | OUTPATIENT
Start: 2020-12-14 | End: 2021-02-25

## 2020-12-14 NOTE — TELEPHONE ENCOUNTER
Patient is calling to check on the below medication refill. Patient states he is choosing not to come into the office during covid. Patient is concerned at this time that he only has two pills left and gets withdraws when he does not take the medication.

## 2020-12-14 NOTE — TELEPHONE ENCOUNTER
See TT 12/1/20 for DR. MITTAL response; One refill done    Requested Prescriptions     Signed Prescriptions Disp Refills   • SILODOSIN 8 MG Oral Cap 90 capsule 0     Sig: TAKE 1 CAPSULE (8 MG TOTAL) BY MOUTH ONCE DAILY. Authorizing Provider:  Yuri Hunter

## 2021-02-23 ENCOUNTER — TELEPHONE (OUTPATIENT)
Dept: INTERNAL MEDICINE CLINIC | Facility: CLINIC | Age: 56
End: 2021-02-23

## 2021-02-23 DIAGNOSIS — Z12.5 SCREENING PSA (PROSTATE SPECIFIC ANTIGEN): ICD-10-CM

## 2021-02-23 DIAGNOSIS — E78.00 HYPERCHOLESTEREMIA: ICD-10-CM

## 2021-02-23 DIAGNOSIS — Z00.00 ANNUAL PHYSICAL EXAM: Primary | ICD-10-CM

## 2021-02-23 NOTE — TELEPHONE ENCOUNTER
To  - please call pt to schedule annual physical, will need fasting labs as well. After scheduling please send back to Rx. Thank you!

## 2021-02-24 NOTE — TELEPHONE ENCOUNTER
rolanda Beverly  Patient scheduled a yearly physical on 3/10 he scheduled the kelsi as a video visit  He doesn't feel comfortable coming into the office due to Covid    Patient scheduled a lab kelsi on 3/1, he is requesting a lab order be entered in the system

## 2021-02-25 RX ORDER — SILODOSIN 8 MG/1
8 CAPSULE ORAL
Qty: 90 CAPSULE | Refills: 3 | Status: SHIPPED | OUTPATIENT
Start: 2021-02-25

## 2021-02-25 RX ORDER — SILODOSIN 8 MG/1
8 CAPSULE ORAL
Qty: 90 CAPSULE | Refills: 0 | Status: SHIPPED | OUTPATIENT
Start: 2021-02-25 | End: 2021-02-25

## 2021-02-25 RX ORDER — SUMATRIPTAN 50 MG/1
TABLET, FILM COATED ORAL
Qty: 9 TABLET | Refills: 5 | Status: SHIPPED | OUTPATIENT
Start: 2021-02-25 | End: 2022-01-11

## 2021-02-25 RX ORDER — CELECOXIB 200 MG/1
CAPSULE ORAL
Qty: 90 CAPSULE | Refills: 3 | Status: ON HOLD | OUTPATIENT
Start: 2021-02-25 | End: 2021-09-23

## 2021-02-25 NOTE — TELEPHONE ENCOUNTER
ERx sent for Celebrex 200 mg po every day #90, 3 RF, and sumatriptan 50 mg po every day, #9, 5 RF, and Rapaflo 8 mg po at bedtime #90, 3 RF    Fasting labs ordered; please call patient

## 2021-03-01 ENCOUNTER — LAB ENCOUNTER (OUTPATIENT)
Dept: LAB | Age: 56
End: 2021-03-01
Attending: INTERNAL MEDICINE
Payer: COMMERCIAL

## 2021-03-01 DIAGNOSIS — E78.00 HYPERCHOLESTEREMIA: ICD-10-CM

## 2021-03-01 DIAGNOSIS — Z00.00 ANNUAL PHYSICAL EXAM: ICD-10-CM

## 2021-03-01 DIAGNOSIS — Z12.5 SCREENING PSA (PROSTATE SPECIFIC ANTIGEN): ICD-10-CM

## 2021-03-01 LAB
ALBUMIN SERPL-MCNC: 3.7 G/DL (ref 3.4–5)
ALBUMIN/GLOB SERPL: 1.3 {RATIO} (ref 1–2)
ALP LIVER SERPL-CCNC: 53 U/L
ALT SERPL-CCNC: 34 U/L
ANION GAP SERPL CALC-SCNC: 2 MMOL/L (ref 0–18)
AST SERPL-CCNC: 19 U/L (ref 15–37)
BASOPHILS # BLD AUTO: 0.06 X10(3) UL (ref 0–0.2)
BASOPHILS NFR BLD AUTO: 1.2 %
BILIRUB SERPL-MCNC: 0.8 MG/DL (ref 0.1–2)
BUN BLD-MCNC: 20 MG/DL (ref 7–18)
BUN/CREAT SERPL: 16.4 (ref 10–20)
CALCIUM BLD-MCNC: 8.8 MG/DL (ref 8.5–10.1)
CHLORIDE SERPL-SCNC: 110 MMOL/L (ref 98–112)
CHOLEST SMN-MCNC: 217 MG/DL (ref ?–200)
CO2 SERPL-SCNC: 28 MMOL/L (ref 21–32)
COMPLEXED PSA SERPL-MCNC: 0.37 NG/ML (ref ?–4)
CREAT BLD-MCNC: 1.22 MG/DL
DEPRECATED RDW RBC AUTO: 45 FL (ref 35.1–46.3)
EOSINOPHIL # BLD AUTO: 0.09 X10(3) UL (ref 0–0.7)
EOSINOPHIL NFR BLD AUTO: 1.8 %
ERYTHROCYTE [DISTWIDTH] IN BLOOD BY AUTOMATED COUNT: 12.6 % (ref 11–15)
GLOBULIN PLAS-MCNC: 2.8 G/DL (ref 2.8–4.4)
GLUCOSE BLD-MCNC: 96 MG/DL (ref 70–99)
HCT VFR BLD AUTO: 41.8 %
HDLC SERPL-MCNC: 62 MG/DL (ref 40–59)
HGB BLD-MCNC: 14.4 G/DL
IMM GRANULOCYTES # BLD AUTO: 0.01 X10(3) UL (ref 0–1)
IMM GRANULOCYTES NFR BLD: 0.2 %
LDLC SERPL CALC-MCNC: 141 MG/DL (ref ?–100)
LYMPHOCYTES # BLD AUTO: 1.58 X10(3) UL (ref 1–4)
LYMPHOCYTES NFR BLD AUTO: 31.8 %
M PROTEIN MFR SERPL ELPH: 6.5 G/DL (ref 6.4–8.2)
MCH RBC QN AUTO: 33.3 PG (ref 26–34)
MCHC RBC AUTO-ENTMCNC: 34.4 G/DL (ref 31–37)
MCV RBC AUTO: 96.5 FL
MONOCYTES # BLD AUTO: 0.4 X10(3) UL (ref 0.1–1)
MONOCYTES NFR BLD AUTO: 8 %
NEUTROPHILS # BLD AUTO: 2.83 X10 (3) UL (ref 1.5–7.7)
NEUTROPHILS # BLD AUTO: 2.83 X10(3) UL (ref 1.5–7.7)
NEUTROPHILS NFR BLD AUTO: 57 %
NONHDLC SERPL-MCNC: 155 MG/DL (ref ?–130)
OSMOLALITY SERPL CALC.SUM OF ELEC: 292 MOSM/KG (ref 275–295)
PATIENT FASTING Y/N/NP: YES
PATIENT FASTING Y/N/NP: YES
PLATELET # BLD AUTO: 204 10(3)UL (ref 150–450)
POTASSIUM SERPL-SCNC: 4.3 MMOL/L (ref 3.5–5.1)
RBC # BLD AUTO: 4.33 X10(6)UL
SODIUM SERPL-SCNC: 140 MMOL/L (ref 136–145)
T4 FREE SERPL-MCNC: 0.9 NG/DL (ref 0.8–1.7)
TRIGL SERPL-MCNC: 71 MG/DL (ref 30–149)
TSI SER-ACNC: 4.51 MIU/ML (ref 0.36–3.74)
VLDLC SERPL CALC-MCNC: 14 MG/DL (ref 0–30)
WBC # BLD AUTO: 5 X10(3) UL (ref 4–11)

## 2021-03-01 PROCEDURE — 80053 COMPREHEN METABOLIC PANEL: CPT

## 2021-03-01 PROCEDURE — 84443 ASSAY THYROID STIM HORMONE: CPT

## 2021-03-01 PROCEDURE — 36415 COLL VENOUS BLD VENIPUNCTURE: CPT

## 2021-03-01 PROCEDURE — 80061 LIPID PANEL: CPT

## 2021-03-01 PROCEDURE — 85025 COMPLETE CBC W/AUTO DIFF WBC: CPT

## 2021-03-01 PROCEDURE — 84439 ASSAY OF FREE THYROXINE: CPT

## 2021-03-10 ENCOUNTER — TELEMEDICINE (OUTPATIENT)
Dept: INTERNAL MEDICINE CLINIC | Facility: CLINIC | Age: 56
End: 2021-03-10

## 2021-03-10 DIAGNOSIS — D03.8 MELANOMA IN SITU OF OTHER SITE (HCC): ICD-10-CM

## 2021-03-10 DIAGNOSIS — Z00.00 ANNUAL PHYSICAL EXAM: Primary | ICD-10-CM

## 2021-03-10 DIAGNOSIS — F51.01 PRIMARY INSOMNIA: ICD-10-CM

## 2021-03-10 DIAGNOSIS — N40.1 BENIGN NON-NODULAR PROSTATIC HYPERPLASIA WITH LOWER URINARY TRACT SYMPTOMS: ICD-10-CM

## 2021-03-10 DIAGNOSIS — M17.12 LOCALIZED OSTEOARTHRITIS OF LEFT KNEE: ICD-10-CM

## 2021-03-10 PROCEDURE — 99396 PREV VISIT EST AGE 40-64: CPT | Performed by: INTERNAL MEDICINE

## 2021-03-10 NOTE — PROGRESS NOTES
Video Visit    3693 Zulema Sosa verbally consents to a Video VisitCheck-In visit on 03/10/21. Patient has been referred to the [facility] website at [email or web address] to review the yearly Consent to Treat document.     Patient understands and accepts fi suspect migraine variant; responding well to sumatriptan 50 mg po qD prn     PSA screening  PSA 0.37 in March 2021     Cell [phone]       Continue with current treatment plan.       Eamon Dozier MD

## 2021-06-11 ENCOUNTER — OFFICE VISIT (OUTPATIENT)
Dept: INTERNAL MEDICINE CLINIC | Facility: CLINIC | Age: 56
End: 2021-06-11
Payer: COMMERCIAL

## 2021-06-11 VITALS
DIASTOLIC BLOOD PRESSURE: 82 MMHG | WEIGHT: 222 LBS | SYSTOLIC BLOOD PRESSURE: 122 MMHG | OXYGEN SATURATION: 98 % | HEIGHT: 76 IN | HEART RATE: 83 BPM | BODY MASS INDEX: 27.03 KG/M2

## 2021-06-11 DIAGNOSIS — G25.81 RLS (RESTLESS LEGS SYNDROME): ICD-10-CM

## 2021-06-11 DIAGNOSIS — M54.2 NECK PAIN: Primary | ICD-10-CM

## 2021-06-11 DIAGNOSIS — C43.59 MALIGNANT MELANOMA OF TORSO EXCLUDING BREAST (HCC): ICD-10-CM

## 2021-06-11 PROCEDURE — 3008F BODY MASS INDEX DOCD: CPT | Performed by: INTERNAL MEDICINE

## 2021-06-11 PROCEDURE — 3074F SYST BP LT 130 MM HG: CPT | Performed by: INTERNAL MEDICINE

## 2021-06-11 PROCEDURE — 99214 OFFICE O/P EST MOD 30 MIN: CPT | Performed by: INTERNAL MEDICINE

## 2021-06-11 PROCEDURE — 3079F DIAST BP 80-89 MM HG: CPT | Performed by: INTERNAL MEDICINE

## 2021-06-11 RX ORDER — CLONAZEPAM 1 MG/1
1 TABLET ORAL NIGHTLY
Qty: 90 TABLET | Refills: 3 | Status: SHIPPED | OUTPATIENT
Start: 2021-06-11 | End: 2021-12-21

## 2021-06-11 NOTE — PROGRESS NOTES
Gerhardt Manos is a 48year old male.     HPI:   Patient presents with:  Checkup: discomfort / abnormal feeling near hernández apple x 1 month; with recent derm visit melanoma stage 0 he iis being more cautious       49 y/o M with c/o pain to thyroid cartil tablet 1   • celecoxib 200 MG Oral Cap Take 1 capsule (200 mg total) by mouth once daily. 90 capsule 3   • NITRO-BID 2 % Transdermal Ointment Apply 1 Application topically 2 (two) times daily.   1   • diazepam 5 MG Oral Tab Take 1 tablet (5 mg total) by myles 5 years (or Nov 2021) due to the poor - fair quality of his bowel prep;  Tdap given Oct 2018     Anxiety  Does not want SSRI; on diazepam 5 mg po BID prn     BPH (benign prostatic hypertrophy)  On Rapaflo 8 mg po qHS per  Dr Nelsy Jorge; refilled #90 3 RF

## 2021-09-03 ENCOUNTER — OFFICE VISIT (OUTPATIENT)
Dept: FAMILY MEDICINE CLINIC | Facility: CLINIC | Age: 56
End: 2021-09-03
Payer: COMMERCIAL

## 2021-09-03 VITALS
DIASTOLIC BLOOD PRESSURE: 78 MMHG | WEIGHT: 222 LBS | BODY MASS INDEX: 27.03 KG/M2 | HEART RATE: 78 BPM | HEIGHT: 76 IN | RESPIRATION RATE: 15 BRPM | SYSTOLIC BLOOD PRESSURE: 128 MMHG | OXYGEN SATURATION: 98 % | TEMPERATURE: 98 F

## 2021-09-03 DIAGNOSIS — H00.011 HORDEOLUM EXTERNUM OF RIGHT UPPER EYELID: Primary | ICD-10-CM

## 2021-09-03 PROCEDURE — 3078F DIAST BP <80 MM HG: CPT | Performed by: NURSE PRACTITIONER

## 2021-09-03 PROCEDURE — 3008F BODY MASS INDEX DOCD: CPT | Performed by: NURSE PRACTITIONER

## 2021-09-03 PROCEDURE — 3074F SYST BP LT 130 MM HG: CPT | Performed by: NURSE PRACTITIONER

## 2021-09-03 PROCEDURE — 99202 OFFICE O/P NEW SF 15 MIN: CPT | Performed by: NURSE PRACTITIONER

## 2021-09-03 RX ORDER — ERYTHROMYCIN 5 MG/G
OINTMENT OPHTHALMIC
Qty: 1 G | Refills: 0 | Status: SHIPPED | OUTPATIENT
Start: 2021-09-03 | End: 2021-09-21

## 2021-09-03 NOTE — PROGRESS NOTES
CHIEF COMPLAINT:   Patient presents with:  Eye Problem: Possible Stye - Entered by patient      HPI:   JoseE nrique Deal is a 55year old male who presents with chief complaint of possible stye. Symptoms began  2  days ago.  Symptoms have been worsening Lumbar disc disease    • Melanoma in situ of back [facility]) August 2016    Stage 0   • Unspecified essential hypertension       Past Surgical History:   Procedure Laterality Date   • COLONOSCOPY  11/16   • CORTISONE INJECTION      both knee and right elbow.  do Sam Cooley is a 55year old male who presents with:    ASSESSMENT:   Hordeolum externum of right upper eyelid  (primary encounter diagnosis)    PLAN:   - Advised frequent warm compresses. - Medication as listed below.   - Advised patient to av

## 2021-09-14 ENCOUNTER — APPOINTMENT (OUTPATIENT)
Dept: URBAN - METROPOLITAN AREA CLINIC 321 | Age: 56
Setting detail: DERMATOLOGY
End: 2021-09-15

## 2021-09-14 DIAGNOSIS — L81.4 OTHER MELANIN HYPERPIGMENTATION: ICD-10-CM

## 2021-09-14 DIAGNOSIS — D22 MELANOCYTIC NEVI: ICD-10-CM

## 2021-09-14 DIAGNOSIS — Z85.828 PERSONAL HISTORY OF OTHER MALIGNANT NEOPLASM OF SKIN: ICD-10-CM

## 2021-09-14 DIAGNOSIS — Z87.2 PERSONAL HISTORY OF DISEASES OF THE SKIN AND SUBCUTANEOUS TISSUE: ICD-10-CM

## 2021-09-14 DIAGNOSIS — A63.0 ANOGENITAL (VENEREAL) WARTS: ICD-10-CM

## 2021-09-14 DIAGNOSIS — Z86.006 PERSONAL HISTORY OF MELANOMA IN-SITU: ICD-10-CM

## 2021-09-14 PROBLEM — Z85.820 PERSONAL HISTORY OF MALIGNANT MELANOMA OF SKIN: Status: ACTIVE | Noted: 2021-09-14

## 2021-09-14 PROBLEM — D22.5 MELANOCYTIC NEVI OF TRUNK: Status: ACTIVE | Noted: 2021-09-14

## 2021-09-14 PROCEDURE — 54056 CRYOSURGERY PENIS LESION(S): CPT

## 2021-09-14 PROCEDURE — OTHER LIQUID NITROGEN: OTHER

## 2021-09-14 PROCEDURE — OTHER DEFER: OTHER

## 2021-09-14 PROCEDURE — OTHER COUNSELING: OTHER

## 2021-09-14 PROCEDURE — 99213 OFFICE O/P EST LOW 20 MIN: CPT | Mod: 25

## 2021-09-14 ASSESSMENT — LOCATION DETAILED DESCRIPTION DERM
LOCATION DETAILED: LEFT SUPERIOR MEDIAL UPPER BACK
LOCATION DETAILED: RIGHT BUTTOCK
LOCATION DETAILED: RIGHT INFERIOR MEDIAL MIDBACK
LOCATION DETAILED: RIGHT INFERIOR MEDIAL UPPER BACK
LOCATION DETAILED: LEFT INFERIOR MEDIAL UPPER BACK
LOCATION DETAILED: LEFT SUPERIOR UPPER BACK
LOCATION DETAILED: LEFT INFERIOR MEDIAL MIDBACK
LOCATION DETAILED: LEFT MEDIAL UPPER BACK
LOCATION DETAILED: RIGHT SUPERIOR MEDIAL UPPER BACK
LOCATION DETAILED: RIGHT SUPERIOR MEDIAL LOWER BACK
LOCATION DETAILED: RIGHT SUPERIOR UPPER BACK
LOCATION DETAILED: RIGHT PROXIMAL LATERAL POSTERIOR UPPER ARM
LOCATION DETAILED: PERIUMBILICAL SKIN
LOCATION DETAILED: RIGHT SUPERIOR LATERAL LOWER BACK
LOCATION DETAILED: RIGHT INFERIOR LATERAL MIDBACK
LOCATION DETAILED: SUPERIOR LUMBAR SPINE
LOCATION DETAILED: RIGHT INFERIOR UPPER BACK
LOCATION DETAILED: DORSAL GLANS

## 2021-09-14 ASSESSMENT — LOCATION ZONE DERM
LOCATION ZONE: ARM
LOCATION ZONE: PENIS
LOCATION ZONE: TRUNK

## 2021-09-14 ASSESSMENT — LOCATION SIMPLE DESCRIPTION DERM
LOCATION SIMPLE: PENIS
LOCATION SIMPLE: LEFT LOWER BACK
LOCATION SIMPLE: LOWER BACK
LOCATION SIMPLE: RIGHT UPPER ARM
LOCATION SIMPLE: RIGHT BUTTOCK
LOCATION SIMPLE: ABDOMEN
LOCATION SIMPLE: RIGHT LOWER BACK
LOCATION SIMPLE: RIGHT UPPER BACK
LOCATION SIMPLE: LEFT UPPER BACK

## 2021-09-14 NOTE — PROCEDURE: DEFER
Body Location Override (Optional - Billing Will Still Be Based On Selected Body Map Location If Applicable): R abdomin R of midline
Detail Level: Detailed
Introduction Text (Please End With A Colon): The following procedure was deferred:
Body Location Override (Optional - Billing Will Still Be Based On Selected Body Map Location If Applicable): R lower back R of midline

## 2021-09-14 NOTE — PROCEDURE: LIQUID NITROGEN
Post-Care Instructions: I reviewed with the patient in detail post-care instructions. Patient is to wear sunprotection, and avoid picking at any of the treated lesions. Pt may apply Vaseline to crusted or scabbing areas.
Medical Necessity Clause: This procedure was medically necessary because the lesions that were treated were:
Number Of Freeze-Thaw Cycles: 1 freeze-thaw cycle
Render Note In Bullet Format When Appropriate: No
Detail Level: Detailed
Duration Of Freeze Thaw-Cycle (Seconds): 5-10
Consent: The patient's consent was obtained including but not limited to risks of crusting, scabbing, blistering, scarring, darker or lighter pigmentary change, recurrence, incomplete removal and infection.
Show Aperture Variable?: Yes
Medical Necessity Information: It is in your best interest to select a reason for this procedure from the list below. All of these items fulfill various CMS LCD requirements except the new and changing color options.

## 2021-09-14 NOTE — HPI: MELANOMA F/U (HISTORY OF MALIGNANT MELANOMA)
What Is The Reason For Today's Visit?: Surveillance against skin cancer recurrences
Year Excised?: 8/12/2016, 5/26/2021

## 2021-09-20 ENCOUNTER — LAB ENCOUNTER (OUTPATIENT)
Dept: LAB | Facility: HOSPITAL | Age: 56
End: 2021-09-20
Attending: ORTHOPAEDIC SURGERY
Payer: COMMERCIAL

## 2021-09-20 DIAGNOSIS — Z01.818 PREOP TESTING: ICD-10-CM

## 2021-09-22 LAB — SARS-COV-2 RNA RESP QL NAA+PROBE: NOT DETECTED

## 2021-09-23 ENCOUNTER — HOSPITAL ENCOUNTER (OUTPATIENT)
Facility: HOSPITAL | Age: 56
Setting detail: HOSPITAL OUTPATIENT SURGERY
Discharge: HOME | End: 2021-09-23
Attending: ORTHOPAEDIC SURGERY | Admitting: ORTHOPAEDIC SURGERY
Payer: COMMERCIAL

## 2021-09-23 ENCOUNTER — ANESTHESIA (OUTPATIENT)
Dept: SURGERY | Facility: HOSPITAL | Age: 56
End: 2021-09-23
Payer: COMMERCIAL

## 2021-09-23 ENCOUNTER — ANESTHESIA EVENT (OUTPATIENT)
Dept: SURGERY | Facility: HOSPITAL | Age: 56
End: 2021-09-23
Payer: COMMERCIAL

## 2021-09-23 VITALS
WEIGHT: 221 LBS | DIASTOLIC BLOOD PRESSURE: 80 MMHG | HEIGHT: 76 IN | BODY MASS INDEX: 26.91 KG/M2 | HEART RATE: 67 BPM | SYSTOLIC BLOOD PRESSURE: 130 MMHG | OXYGEN SATURATION: 99 % | TEMPERATURE: 98 F | RESPIRATION RATE: 17 BRPM

## 2021-09-23 DIAGNOSIS — Z01.818 PREOP TESTING: Primary | ICD-10-CM

## 2021-09-23 PROBLEM — M75.101 RIGHT ROTATOR CUFF TEAR: Status: ACTIVE | Noted: 2021-09-23

## 2021-09-23 PROCEDURE — 0RNJ4ZZ RELEASE RIGHT SHOULDER JOINT, PERCUTANEOUS ENDOSCOPIC APPROACH: ICD-10-PCS | Performed by: ORTHOPAEDIC SURGERY

## 2021-09-23 PROCEDURE — 0PB94ZZ EXCISION OF RIGHT CLAVICLE, PERCUTANEOUS ENDOSCOPIC APPROACH: ICD-10-PCS | Performed by: ORTHOPAEDIC SURGERY

## 2021-09-23 PROCEDURE — 0LQ10ZZ REPAIR RIGHT SHOULDER TENDON, OPEN APPROACH: ICD-10-PCS | Performed by: ORTHOPAEDIC SURGERY

## 2021-09-23 PROCEDURE — 0RHJ04Z INSERTION OF INTERNAL FIXATION DEVICE INTO RIGHT SHOULDER JOINT, OPEN APPROACH: ICD-10-PCS | Performed by: ORTHOPAEDIC SURGERY

## 2021-09-23 PROCEDURE — 0RBJ4ZZ EXCISION OF RIGHT SHOULDER JOINT, PERCUTANEOUS ENDOSCOPIC APPROACH: ICD-10-PCS | Performed by: ORTHOPAEDIC SURGERY

## 2021-09-23 DEVICE — HEALICOIL RG SA 4.75MM W/2 UB-BL                                    CBRD BL
Type: IMPLANTABLE DEVICE | Site: SHOULDER | Status: FUNCTIONAL
Brand: HEALICOIL

## 2021-09-23 RX ORDER — CEFAZOLIN SODIUM/WATER 2 G/20 ML
2 SYRINGE (ML) INTRAVENOUS ONCE
Status: COMPLETED | OUTPATIENT
Start: 2021-09-23 | End: 2021-09-23

## 2021-09-23 RX ORDER — SODIUM CHLORIDE, SODIUM LACTATE, POTASSIUM CHLORIDE, CALCIUM CHLORIDE 600; 310; 30; 20 MG/100ML; MG/100ML; MG/100ML; MG/100ML
INJECTION, SOLUTION INTRAVENOUS CONTINUOUS
Status: DISCONTINUED | OUTPATIENT
Start: 2021-09-23 | End: 2021-09-23

## 2021-09-23 RX ORDER — DEXAMETHASONE SODIUM PHOSPHATE 4 MG/ML
VIAL (ML) INJECTION AS NEEDED
Status: DISCONTINUED | OUTPATIENT
Start: 2021-09-23 | End: 2021-09-23 | Stop reason: SURG

## 2021-09-23 RX ORDER — MIDAZOLAM HYDROCHLORIDE 1 MG/ML
INJECTION INTRAMUSCULAR; INTRAVENOUS AS NEEDED
Status: DISCONTINUED | OUTPATIENT
Start: 2021-09-23 | End: 2021-09-23 | Stop reason: SURG

## 2021-09-23 RX ORDER — BUPIVACAINE HYDROCHLORIDE AND EPINEPHRINE 5; 5 MG/ML; UG/ML
INJECTION, SOLUTION PERINEURAL AS NEEDED
Status: DISCONTINUED | OUTPATIENT
Start: 2021-09-23 | End: 2021-09-23 | Stop reason: HOSPADM

## 2021-09-23 RX ORDER — HYDROMORPHONE HYDROCHLORIDE 1 MG/ML
0.4 INJECTION, SOLUTION INTRAMUSCULAR; INTRAVENOUS; SUBCUTANEOUS EVERY 5 MIN PRN
Status: DISCONTINUED | OUTPATIENT
Start: 2021-09-23 | End: 2021-09-23

## 2021-09-23 RX ORDER — MORPHINE SULFATE 4 MG/ML
4 INJECTION, SOLUTION INTRAMUSCULAR; INTRAVENOUS EVERY 10 MIN PRN
Status: DISCONTINUED | OUTPATIENT
Start: 2021-09-23 | End: 2021-09-23

## 2021-09-23 RX ORDER — HYDROCODONE BITARTRATE AND ACETAMINOPHEN 5; 325 MG/1; MG/1
1 TABLET ORAL AS NEEDED
Status: DISCONTINUED | OUTPATIENT
Start: 2021-09-23 | End: 2021-09-23

## 2021-09-23 RX ORDER — MORPHINE SULFATE 4 MG/ML
2 INJECTION, SOLUTION INTRAMUSCULAR; INTRAVENOUS EVERY 10 MIN PRN
Status: DISCONTINUED | OUTPATIENT
Start: 2021-09-23 | End: 2021-09-23

## 2021-09-23 RX ORDER — HALOPERIDOL 5 MG/ML
0.25 INJECTION INTRAMUSCULAR ONCE AS NEEDED
Status: DISCONTINUED | OUTPATIENT
Start: 2021-09-23 | End: 2021-09-23

## 2021-09-23 RX ORDER — HYDROCODONE BITARTRATE AND ACETAMINOPHEN 5; 325 MG/1; MG/1
2 TABLET ORAL AS NEEDED
Status: DISCONTINUED | OUTPATIENT
Start: 2021-09-23 | End: 2021-09-23

## 2021-09-23 RX ORDER — ONDANSETRON 2 MG/ML
4 INJECTION INTRAMUSCULAR; INTRAVENOUS ONCE AS NEEDED
Status: DISCONTINUED | OUTPATIENT
Start: 2021-09-23 | End: 2021-09-23

## 2021-09-23 RX ORDER — PROCHLORPERAZINE EDISYLATE 5 MG/ML
5 INJECTION INTRAMUSCULAR; INTRAVENOUS ONCE AS NEEDED
Status: DISCONTINUED | OUTPATIENT
Start: 2021-09-23 | End: 2021-09-23

## 2021-09-23 RX ORDER — ACETAMINOPHEN 500 MG
1000 TABLET ORAL ONCE
Status: COMPLETED | OUTPATIENT
Start: 2021-09-23 | End: 2021-09-23

## 2021-09-23 RX ORDER — ONDANSETRON 2 MG/ML
INJECTION INTRAMUSCULAR; INTRAVENOUS AS NEEDED
Status: DISCONTINUED | OUTPATIENT
Start: 2021-09-23 | End: 2021-09-23 | Stop reason: SURG

## 2021-09-23 RX ORDER — MORPHINE SULFATE 10 MG/ML
6 INJECTION, SOLUTION INTRAMUSCULAR; INTRAVENOUS EVERY 10 MIN PRN
Status: DISCONTINUED | OUTPATIENT
Start: 2021-09-23 | End: 2021-09-23

## 2021-09-23 RX ORDER — HYDROMORPHONE HYDROCHLORIDE 1 MG/ML
0.2 INJECTION, SOLUTION INTRAMUSCULAR; INTRAVENOUS; SUBCUTANEOUS EVERY 5 MIN PRN
Status: DISCONTINUED | OUTPATIENT
Start: 2021-09-23 | End: 2021-09-23

## 2021-09-23 RX ORDER — NALOXONE HYDROCHLORIDE 0.4 MG/ML
80 INJECTION, SOLUTION INTRAMUSCULAR; INTRAVENOUS; SUBCUTANEOUS AS NEEDED
Status: DISCONTINUED | OUTPATIENT
Start: 2021-09-23 | End: 2021-09-23

## 2021-09-23 RX ORDER — HYDROMORPHONE HYDROCHLORIDE 1 MG/ML
0.6 INJECTION, SOLUTION INTRAMUSCULAR; INTRAVENOUS; SUBCUTANEOUS EVERY 5 MIN PRN
Status: DISCONTINUED | OUTPATIENT
Start: 2021-09-23 | End: 2021-09-23

## 2021-09-23 RX ADMIN — DEXAMETHASONE SODIUM PHOSPHATE 4 MG: 4 MG/ML VIAL (ML) INJECTION at 07:39:00

## 2021-09-23 RX ADMIN — ONDANSETRON 4 MG: 2 INJECTION INTRAMUSCULAR; INTRAVENOUS at 07:39:00

## 2021-09-23 RX ADMIN — CEFAZOLIN SODIUM/WATER 2 G: 2 G/20 ML SYRINGE (ML) INTRAVENOUS at 07:53:00

## 2021-09-23 RX ADMIN — SODIUM CHLORIDE, SODIUM LACTATE, POTASSIUM CHLORIDE, CALCIUM CHLORIDE: 600; 310; 30; 20 INJECTION, SOLUTION INTRAVENOUS at 07:39:00

## 2021-09-23 RX ADMIN — MIDAZOLAM HYDROCHLORIDE 2 MG: 1 INJECTION INTRAMUSCULAR; INTRAVENOUS at 07:33:00

## 2021-09-23 NOTE — OPERATIVE REPORT
[facility]    PATIENT'S NAME: Noreen Eliane   ATTENDING PHYSICIAN: Barron Haq MD   OPERATING PHYSICIAN: Barron Haq MD   PATIENT ACCOUNT#:   [de-identified]    LOCATION:  [place] [address]  MEDICAL RECORD #:   [id number] off the anterolateral corner of the acromion, and the undersurface of the acromion was debrided. An acromioplasty was performed, making smooth transition to the [facility] joint. The scope was repositioned from anterolaterally and from anteriorly.   Distal clavic

## 2021-09-23 NOTE — ANESTHESIA PROCEDURE NOTES
Airway  Date/Time: 9/23/2021 8:01 AM  Urgency: elective    Airway not difficult    General Information and Staff    Patient location during procedure: OR  Anesthesiologist: Gale Camilo MD  Resident/CRNA: Melchor Jimenez CRNA  Performed: JAIR

## 2021-09-23 NOTE — ANESTHESIA POSTPROCEDURE EVALUATION
Patient: Ja Hamm    Procedure Summary     Date: 09/23/21 Room / Location: [address] [place] [address] MAIN OR 10 / [address] [place] [address] MAIN OR    Anesthesia Start: 4488 Anesthesia Stop:     Procedures:       right shoulder arthroscopy, subacromial decompression, distal clavicle res

## 2021-09-23 NOTE — BRIEF OP NOTE
Pre-Operative Diagnosis: right shoulder impingement, right rotator cuff tear     Post-Operative Diagnosis: right shoulder impingement, right rotator cuff tear      Procedure Performed:   right shoulder arthroscopy, subacromial decompression, distal clavicl

## 2021-09-23 NOTE — ANESTHESIA PREPROCEDURE EVALUATION
Anesthesia PreOp Note    HPI:     Mabel rGegorio is a 55year old male who presents for preoperative consultation requested by: Ashu Pratt MD    Date of Surgery: 9/23/2021    Procedure(s):  right shoulder arthroscopy, subacromial decompression, Transdermal Ointment, Apply 1 Application topically 2 (two) times daily. (Patient not taking: Reported on 9/3/2021 ), Disp: , Rfl: 1  diazepam 5 MG Oral Tab, Take 1 tablet (5 mg total) by mouth 2 (two) times daily as needed for Anxiety.  (Patient not taking on file    Social Determinants of Health  Financial Resource Strain:       Difficulty of Paying Living Expenses: Not on file  Food Insecurity:       Worried About Running Out of Food in the Last Year: Not on file      Ran Out of Food in the Last Year: Not Endo/Other      Comments: Melanoma, skin  In situ  Abdominal  - normal exam    Abdomen: soft.   Bowel sounds: normal.               Anesthesia Plan:   ASA:  2  Plan:   General  Post-op Pain Management: Interscalene block  Informed Consent Plan and Risks Dis

## 2021-09-23 NOTE — H&P
Jenny 58 Patient Status:  Hospital Outpatient Surgery    8/3/1965 MRN [id number]   Location [facility] PRE OP RECOVERY Attending Yamila Franco MD   Hosp Day # 0 PCP Sharona Sprague mg total) by mouth once daily. , Disp: 90 capsule, Rfl: 3, 9/22/2021 at 2000  NITRO-BID 2 % Transdermal Ointment, Apply 1 Application topically 2 (two) times daily.  (Patient not taking: Reported on 9/3/2021 ), Disp: , Rfl: 1  diazepam 5 MG Oral Tab, Take 1 RLS (restless legs syndrome)     Melanoma in situ (HCC)     Right rotator cuff tear      Plan right shoulder arthroscopic decompression / distal clavicle resection and open rotator cuff tear.   Pt. Understands the risk of infection, continued pain, recurren

## 2021-12-21 NOTE — TELEPHONE ENCOUNTER
To MD:  The above refill request is for a controlled substance. Please review pended medication order. Print and sign for staff to fax to pharmacy or prescribe electronically.     Last office visit: 6/11/21  Last time refill sent and quantity/refills: 6/

## 2021-12-22 RX ORDER — CLONAZEPAM 1 MG/1
TABLET ORAL
Qty: 90 TABLET | Refills: 3 | Status: SHIPPED | OUTPATIENT
Start: 2021-12-22

## 2021-12-22 NOTE — TELEPHONE ENCOUNTER
Pt called status   Pt is low on medication, hoping to receive refill prior to the weekend  Tasked to Dr Salina Lucio

## 2022-01-11 RX ORDER — SUMATRIPTAN 50 MG/1
TABLET, FILM COATED ORAL
Qty: 9 TABLET | Refills: 5 | Status: SHIPPED | OUTPATIENT
Start: 2022-01-11

## 2022-02-08 ENCOUNTER — APPOINTMENT (OUTPATIENT)
Dept: URBAN - METROPOLITAN AREA CLINIC 321 | Age: 57
Setting detail: DERMATOLOGY
End: 2022-02-08

## 2022-02-08 DIAGNOSIS — D22 MELANOCYTIC NEVI: ICD-10-CM

## 2022-02-08 PROBLEM — D48.5 NEOPLASM OF UNCERTAIN BEHAVIOR OF SKIN: Status: ACTIVE | Noted: 2022-02-08

## 2022-02-08 PROCEDURE — 11401 EXC TR-EXT B9+MARG 0.6-1 CM: CPT

## 2022-02-08 PROCEDURE — OTHER PUNCH EXCISION: OTHER

## 2022-02-08 PROCEDURE — 12031 INTMD RPR S/A/T/EXT 2.5 CM/<: CPT

## 2022-02-08 PROCEDURE — 11401 EXC TR-EXT B9+MARG 0.6-1 CM: CPT | Mod: 76

## 2022-02-08 ASSESSMENT — LOCATION DETAILED DESCRIPTION DERM
LOCATION DETAILED: RIGHT INFERIOR MEDIAL MIDBACK
LOCATION DETAILED: PERIUMBILICAL SKIN

## 2022-02-08 ASSESSMENT — LOCATION SIMPLE DESCRIPTION DERM
LOCATION SIMPLE: ABDOMEN
LOCATION SIMPLE: RIGHT LOWER BACK

## 2022-02-08 ASSESSMENT — LOCATION ZONE DERM: LOCATION ZONE: TRUNK

## 2022-02-08 NOTE — PROCEDURE: PUNCH EXCISION
Complex Requirements: Exposure Of Vital Structure?: No
Size Of Lesion (*Required): 0.4
Intermediate / Complex Repair - Final Wound Length In Cm: 1
Undermining Type: Entire Wound
Retention Suture Text: Retention sutures were placed to support the closure and prevent dehiscence.
Notification Instructions: Patient will be notified of biopsy results. However, patient instructed to call the office if not contacted within 2 weeks.
Medical Necessity Clause: This procedure was medically necessary because the lesion that was treated was:
Debridement Text: The wound edges were debrided prior to proceeding with the closure to facilitate wound healing.
4.5 Mm Punch Excision Text: A 4.5 mm punch biopsy was used to excise the lesion to the level of the subcutaneous fat.  Blunt dissection was used to free the lesion from the surrounding tissues and the lesion was removed.
10 Mm Punch Excision Text: A 10 mm punch biopsy was used to excise the lesion to the level of the subcutaneous fat.  Blunt dissection was used to free the lesion from the surrounding tissues and the lesion was removed.
Billing Type: Third-Party Bill
Helical Rim Text: The closure involved the helical rim.
X Depth Of Punch In Cm (Optional): 0
Wound Dressings: a bandage
Hemostasis: None
4 Mm Punch Excision Text: A 4 mm punch biopsy was used to excise the lesion to the level of the subcutaneous fat.  Blunt dissection was used to free the lesion from the surrounding tissues and the lesion was removed.
2 Mm Punch Excision Text: A 2 mm punch biopsy was used to excise the lesion to the level of the subcutaneous fat.  Blunt dissection was used to free the lesion from the surrounding tissues and the lesion was removed.
Anesthesia Volume In Cc: 3
Size Of Lesion (*Required): 0.6
7 Mm Punch Excision Text: A 7 mm punch biopsy was used to excise the lesion to the level of the subcutaneous fat.  Blunt dissection was used to free the lesion from the surrounding tissues and the lesion was removed.
Size Of Margin In Cm: 0.1
Deep Sutures: 3-0 Vicryl
Post-Care Instructions: I reviewed with the patient in detail post-care instructions. Patient is to keep the biopsy site dry overnight, and then apply Vaseline with or without a band aid twice daily until sutures removed.
Wound Care: Petrolatum
12 Mm Punch Excision Text: A 12 mm punch biopsy was used to excise the lesion to the level of the subcutaneous fat.  Blunt dissection was used to free the lesion from the surrounding tissues and the lesion was removed.
8 Mm Punch Excision Text: A 8 mm punch biopsy was used to excise the lesion to the level of the subcutaneous fat.  Blunt dissection was used to free the lesion from the surrounding tissues and the lesion was removed.
Epidermal Closure: simple interrupted
3 Mm Punch Excision Text: A 3 mm punch biopsy was used to excise the lesion to the level of the subcutaneous fat.  Blunt dissection was used to free the lesion from the surrounding tissues and the lesion was removed.
Repair Type: Intermediate
Detail Level: Detailed
1.5 Mm Punch Excision Text: A 1.5 mm punch biopsy was used to excise the lesion to the level of the subcutaneous fat.  Blunt dissection was used to free the lesion from the surrounding tissues and the lesion was removed.
Nostril Rim Text: The closure involved the nostril rim.
6 Mm Punch Excision Text: A 6 mm punch biopsy was used to excise the lesion to the level of the subcutaneous fat.  Blunt dissection was used to free the lesion from the surrounding tissues and the lesion was removed.
3.5 Mm Punch Excision Text: A 3.5 mm punch biopsy was used to excise the lesion to the level of the subcutaneous fat.  Blunt dissection was used to free the lesion from the surrounding tissues and the lesion was removed.
2.5 Mm Punch Excision Text: A 2.5 mm punch biopsy was used to excise the lesion to the level of the subcutaneous fat.  Blunt dissection was used to free the lesion from the surrounding tissues and the lesion was removed.
Suture Removal: 14 days
Anesthesia Type: 0.5% lidocaine with 1:200,000 epinephrine and a 1:10 solution of 8.4% sodium bicarbonate
Deep Sutures: 4-0 Vicryl
Wound Care: Vaseline
Epidermal Sutures: 4-0 Nylon
Anesthesia Volume In Cc: 6
5 Mm Punch Excision Text: A 5 mm punch biopsy was used to excise the lesion to the level of the subcutaneous fat.  Blunt dissection was used to free the lesion from the surrounding tissues and the lesion was removed.
Vermilion Border Text: The closure involved the vermilion border.
Path Notes (To The Dermatopathologist): Please check margins.
Punch Size In Mm: 8
Render Post-Care Instructions In Note?: yes
Consent was obtained from the patient. The risks and benefits to therapy were discussed in detail. Specifically, the risks of infection, scarring, bleeding, prolonged wound healing, incomplete removal, allergy to anesthesia, nerve injury and recurrence were addressed.
Consent was obtained from the patient. The risks and benefits to therapy were discussed in detail. Specifically, the risks of infection, scarring, bleeding, prolonged wound healing, incomplete removal, allergy to anesthesia, nerve injury and recurrence were addressed. Prior to the procedure, the treatment site was clearly identified and confirmed by the patient.
Post-Care Instructions: I reviewed with the patient in detail post-care instructions. Patient is to keep the biopsy site dry 36hrs, and then apply Vaseline twice daily until healed. Patient may apply hydrogen peroxide soaks to remove any crusting.
Anesthesia Type: 1% lidocaine with 1:100,000 epinephrine and a 1:10 solution of 8.4% sodium bicarbonate

## 2022-02-22 ENCOUNTER — APPOINTMENT (OUTPATIENT)
Dept: URBAN - METROPOLITAN AREA CLINIC 321 | Age: 57
Setting detail: DERMATOLOGY
End: 2022-02-22

## 2022-02-22 DIAGNOSIS — Z48.02 ENCOUNTER FOR REMOVAL OF SUTURES: ICD-10-CM

## 2022-02-22 PROCEDURE — OTHER SUTURE REMOVAL (GLOBAL PERIOD): OTHER

## 2022-02-22 PROCEDURE — 99024 POSTOP FOLLOW-UP VISIT: CPT

## 2022-02-22 ASSESSMENT — LOCATION DETAILED DESCRIPTION DERM
LOCATION DETAILED: PERIUMBILICAL SKIN
LOCATION DETAILED: RIGHT SUPERIOR MEDIAL LOWER BACK

## 2022-02-22 ASSESSMENT — LOCATION SIMPLE DESCRIPTION DERM
LOCATION SIMPLE: RIGHT LOWER BACK
LOCATION SIMPLE: ABDOMEN

## 2022-02-22 ASSESSMENT — LOCATION ZONE DERM: LOCATION ZONE: TRUNK

## 2022-02-22 NOTE — PROCEDURE: SUTURE REMOVAL (GLOBAL PERIOD)
Add 56058 Cpt? (Important Note: In 2017 The Use Of 05885 Is Being Tracked By Cms To Determine Future Global Period Reimbursement For Global Periods): yes
Detail Level: Detailed

## 2022-03-17 RX ORDER — CELECOXIB 200 MG/1
CAPSULE ORAL
Qty: 90 CAPSULE | Refills: 3 | Status: SHIPPED | OUTPATIENT
Start: 2022-03-17

## 2022-04-23 ENCOUNTER — TELEPHONE (OUTPATIENT)
Dept: INTERNAL MEDICINE CLINIC | Facility: CLINIC | Age: 57
End: 2022-04-23

## 2022-04-23 NOTE — TELEPHONE ENCOUNTER
Called and spoke to pt he will call the office at the beginning of the week and get scheduled for a visit and labs.  Labs pended and sent to Dr. Brizuela Notice for review

## 2022-04-25 RX ORDER — SILODOSIN 8 MG/1
CAPSULE ORAL
Qty: 90 CAPSULE | Refills: 3 | Status: SHIPPED | OUTPATIENT
Start: 2022-04-25

## 2022-05-02 ENCOUNTER — LAB ENCOUNTER (OUTPATIENT)
Dept: LAB | Age: 57
End: 2022-05-02
Attending: INTERNAL MEDICINE
Payer: COMMERCIAL

## 2022-05-02 DIAGNOSIS — E78.00 HYPERCHOLESTEREMIA: ICD-10-CM

## 2022-05-02 DIAGNOSIS — Z12.5 SCREENING PSA (PROSTATE SPECIFIC ANTIGEN): ICD-10-CM

## 2022-05-02 DIAGNOSIS — Z00.00 ANNUAL PHYSICAL EXAM: ICD-10-CM

## 2022-05-02 LAB
ALBUMIN SERPL-MCNC: 4.1 G/DL (ref 3.4–5)
ALBUMIN/GLOB SERPL: 1.4 {RATIO} (ref 1–2)
ALP LIVER SERPL-CCNC: 49 U/L
ALT SERPL-CCNC: 23 U/L
ANION GAP SERPL CALC-SCNC: 7 MMOL/L (ref 0–18)
AST SERPL-CCNC: 16 U/L (ref 15–37)
BASOPHILS # BLD AUTO: 0.04 X10(3) UL (ref 0–0.2)
BASOPHILS NFR BLD AUTO: 0.9 %
BILIRUB SERPL-MCNC: 0.7 MG/DL (ref 0.1–2)
BUN BLD-MCNC: 20 MG/DL (ref 7–18)
BUN/CREAT SERPL: 16.9 (ref 10–20)
CALCIUM BLD-MCNC: 9.2 MG/DL (ref 8.5–10.1)
CHLORIDE SERPL-SCNC: 109 MMOL/L (ref 98–112)
CHOLEST SERPL-MCNC: 239 MG/DL (ref ?–200)
CO2 SERPL-SCNC: 27 MMOL/L (ref 21–32)
COMPLEXED PSA SERPL-MCNC: 0.49 NG/ML (ref ?–4)
CREAT BLD-MCNC: 1.18 MG/DL
DEPRECATED RDW RBC AUTO: 46.1 FL (ref 35.1–46.3)
EOSINOPHIL # BLD AUTO: 0.07 X10(3) UL (ref 0–0.7)
EOSINOPHIL NFR BLD AUTO: 1.5 %
ERYTHROCYTE [DISTWIDTH] IN BLOOD BY AUTOMATED COUNT: 12.8 % (ref 11–15)
FASTING PATIENT LIPID ANSWER: YES
FASTING STATUS PATIENT QL REPORTED: YES
GLOBULIN PLAS-MCNC: 3 G/DL (ref 2.8–4.4)
GLUCOSE BLD-MCNC: 89 MG/DL (ref 70–99)
HCT VFR BLD AUTO: 43.5 %
HDLC SERPL-MCNC: 65 MG/DL (ref 40–59)
HGB BLD-MCNC: 14.7 G/DL
IMM GRANULOCYTES # BLD AUTO: 0 X10(3) UL (ref 0–1)
IMM GRANULOCYTES NFR BLD: 0 %
LDLC SERPL CALC-MCNC: 165 MG/DL (ref ?–100)
LYMPHOCYTES # BLD AUTO: 1.7 X10(3) UL (ref 1–4)
LYMPHOCYTES NFR BLD AUTO: 36.6 %
MCH RBC QN AUTO: 32.8 PG (ref 26–34)
MCHC RBC AUTO-ENTMCNC: 33.8 G/DL (ref 31–37)
MCV RBC AUTO: 97.1 FL
MONOCYTES # BLD AUTO: 0.32 X10(3) UL (ref 0.1–1)
MONOCYTES NFR BLD AUTO: 6.9 %
NEUTROPHILS # BLD AUTO: 2.52 X10 (3) UL (ref 1.5–7.7)
NEUTROPHILS # BLD AUTO: 2.52 X10(3) UL (ref 1.5–7.7)
NEUTROPHILS NFR BLD AUTO: 54.1 %
NONHDLC SERPL-MCNC: 174 MG/DL (ref ?–130)
OSMOLALITY SERPL CALC.SUM OF ELEC: 298 MOSM/KG (ref 275–295)
PLATELET # BLD AUTO: 245 10(3)UL (ref 150–450)
POTASSIUM SERPL-SCNC: 4.2 MMOL/L (ref 3.5–5.1)
PROT SERPL-MCNC: 7.1 G/DL (ref 6.4–8.2)
RBC # BLD AUTO: 4.48 X10(6)UL
SODIUM SERPL-SCNC: 143 MMOL/L (ref 136–145)
TRIGL SERPL-MCNC: 55 MG/DL (ref 30–149)
TSI SER-ACNC: 3.47 MIU/ML (ref 0.36–3.74)
VLDLC SERPL CALC-MCNC: 11 MG/DL (ref 0–30)
WBC # BLD AUTO: 4.7 X10(3) UL (ref 4–11)

## 2022-05-02 PROCEDURE — 85025 COMPLETE CBC W/AUTO DIFF WBC: CPT

## 2022-05-02 PROCEDURE — 80053 COMPREHEN METABOLIC PANEL: CPT

## 2022-05-02 PROCEDURE — 36415 COLL VENOUS BLD VENIPUNCTURE: CPT

## 2022-05-02 PROCEDURE — 80061 LIPID PANEL: CPT

## 2022-05-02 PROCEDURE — 84443 ASSAY THYROID STIM HORMONE: CPT

## 2022-05-04 ENCOUNTER — OFFICE VISIT (OUTPATIENT)
Dept: INTERNAL MEDICINE CLINIC | Facility: CLINIC | Age: 57
End: 2022-05-04
Payer: COMMERCIAL

## 2022-05-04 VITALS
SYSTOLIC BLOOD PRESSURE: 110 MMHG | TEMPERATURE: 99 F | OXYGEN SATURATION: 97 % | BODY MASS INDEX: 27.16 KG/M2 | DIASTOLIC BLOOD PRESSURE: 72 MMHG | HEART RATE: 100 BPM | WEIGHT: 223 LBS | HEIGHT: 76 IN

## 2022-05-04 DIAGNOSIS — Z12.5 SCREENING PSA (PROSTATE SPECIFIC ANTIGEN): ICD-10-CM

## 2022-05-04 DIAGNOSIS — F41.9 ANXIETY: ICD-10-CM

## 2022-05-04 DIAGNOSIS — N40.1 BENIGN NON-NODULAR PROSTATIC HYPERPLASIA WITH LOWER URINARY TRACT SYMPTOMS: ICD-10-CM

## 2022-05-04 DIAGNOSIS — F51.01 PRIMARY INSOMNIA: ICD-10-CM

## 2022-05-04 DIAGNOSIS — Z00.00 PHYSICAL EXAM, ANNUAL: Primary | ICD-10-CM

## 2022-05-04 DIAGNOSIS — M17.12 LOCALIZED OSTEOARTHRITIS OF LEFT KNEE: ICD-10-CM

## 2022-05-04 DIAGNOSIS — G25.81 RLS (RESTLESS LEGS SYNDROME): ICD-10-CM

## 2022-05-04 DIAGNOSIS — E78.00 HYPERCHOLESTEREMIA: ICD-10-CM

## 2022-05-04 DIAGNOSIS — S46.011S TRAUMATIC TEAR OF RIGHT ROTATOR CUFF, UNSPECIFIED TEAR EXTENT, SEQUELA: ICD-10-CM

## 2022-05-04 PROCEDURE — 3074F SYST BP LT 130 MM HG: CPT | Performed by: INTERNAL MEDICINE

## 2022-05-04 PROCEDURE — 3008F BODY MASS INDEX DOCD: CPT | Performed by: INTERNAL MEDICINE

## 2022-05-04 PROCEDURE — 99396 PREV VISIT EST AGE 40-64: CPT | Performed by: INTERNAL MEDICINE

## 2022-05-04 PROCEDURE — 3078F DIAST BP <80 MM HG: CPT | Performed by: INTERNAL MEDICINE

## 2022-05-04 RX ORDER — MELOXICAM 15 MG/1
15 TABLET ORAL DAILY
COMMUNITY
End: 2022-05-04

## 2022-05-04 RX ORDER — MELOXICAM 15 MG/1
15 TABLET ORAL DAILY
Qty: 90 TABLET | Refills: 3 | Status: SHIPPED | OUTPATIENT
Start: 2022-05-04

## 2022-07-19 RX ORDER — SUMATRIPTAN 50 MG/1
TABLET, FILM COATED ORAL
Qty: 9 TABLET | Refills: 11 | Status: SHIPPED | OUTPATIENT
Start: 2022-07-19

## 2022-08-23 ENCOUNTER — APPOINTMENT (OUTPATIENT)
Dept: URBAN - METROPOLITAN AREA CLINIC 244 | Age: 57
Setting detail: DERMATOLOGY
End: 2022-08-23

## 2022-08-23 DIAGNOSIS — L82.1 OTHER SEBORRHEIC KERATOSIS: ICD-10-CM

## 2022-08-23 DIAGNOSIS — D22 MELANOCYTIC NEVI: ICD-10-CM

## 2022-08-23 DIAGNOSIS — L81.4 OTHER MELANIN HYPERPIGMENTATION: ICD-10-CM

## 2022-08-23 DIAGNOSIS — Z85.828 PERSONAL HISTORY OF OTHER MALIGNANT NEOPLASM OF SKIN: ICD-10-CM

## 2022-08-23 DIAGNOSIS — Z87.2 PERSONAL HISTORY OF DISEASES OF THE SKIN AND SUBCUTANEOUS TISSUE: ICD-10-CM

## 2022-08-23 PROBLEM — D22.5 MELANOCYTIC NEVI OF TRUNK: Status: ACTIVE | Noted: 2022-08-23

## 2022-08-23 PROCEDURE — 99213 OFFICE O/P EST LOW 20 MIN: CPT

## 2022-08-23 PROCEDURE — OTHER COUNSELING: OTHER

## 2022-08-23 ASSESSMENT — LOCATION DETAILED DESCRIPTION DERM
LOCATION DETAILED: RIGHT PROXIMAL LATERAL POSTERIOR UPPER ARM
LOCATION DETAILED: LEFT SUPERIOR UPPER BACK
LOCATION DETAILED: RIGHT SUPERIOR LATERAL LOWER BACK
LOCATION DETAILED: RIGHT INFERIOR MEDIAL MIDBACK
LOCATION DETAILED: LEFT INFERIOR MEDIAL UPPER BACK
LOCATION DETAILED: RIGHT SUPERIOR MEDIAL UPPER BACK
LOCATION DETAILED: LEFT MEDIAL UPPER BACK
LOCATION DETAILED: SUPERIOR LUMBAR SPINE
LOCATION DETAILED: RIGHT INFERIOR UPPER BACK
LOCATION DETAILED: UPPER STERNUM
LOCATION DETAILED: RIGHT BUTTOCK
LOCATION DETAILED: LEFT SUPERIOR MEDIAL UPPER BACK
LOCATION DETAILED: RIGHT INFERIOR MEDIAL UPPER BACK
LOCATION DETAILED: RIGHT INFERIOR LATERAL MIDBACK
LOCATION DETAILED: PERIUMBILICAL SKIN

## 2022-08-23 ASSESSMENT — LOCATION SIMPLE DESCRIPTION DERM
LOCATION SIMPLE: ABDOMEN
LOCATION SIMPLE: CHEST
LOCATION SIMPLE: RIGHT BUTTOCK
LOCATION SIMPLE: LOWER BACK
LOCATION SIMPLE: RIGHT UPPER ARM
LOCATION SIMPLE: LEFT UPPER BACK
LOCATION SIMPLE: RIGHT UPPER BACK
LOCATION SIMPLE: RIGHT LOWER BACK

## 2022-08-23 ASSESSMENT — LOCATION ZONE DERM
LOCATION ZONE: TRUNK
LOCATION ZONE: ARM

## 2022-09-12 ENCOUNTER — TELEPHONE (OUTPATIENT)
Dept: INTERNAL MEDICINE CLINIC | Facility: CLINIC | Age: 57
End: 2022-09-12

## 2022-09-12 NOTE — TELEPHONE ENCOUNTER
Patient is calling to request a refill on diazepam 5 MG Oral Tab. Please send to [facility] in [place] on [address]. Patient states he was taking this before, months ago, and stopped, but has since felt like he needs to start taking it again to help with his anxiety. Patient's last physical as on 5/4/2022.

## 2022-09-13 NOTE — TELEPHONE ENCOUNTER
To MD:  The above refill request is for a controlled substance. Please review pended medication order. Print and sign for staff to fax to pharmacy or prescribe electronically. Last office visit:5/4/22  Last time refill sent and quantity/refills:  7/12/2018  # 60 3 refills    Patient got clonazepam 1mg 12/22/21 # 90 with 3 refills  To DR. MITTAL

## 2022-09-14 RX ORDER — DIAZEPAM 5 MG/1
5 TABLET ORAL 2 TIMES DAILY PRN
Qty: 60 TABLET | Refills: 3 | Status: SHIPPED | OUTPATIENT
Start: 2022-09-14

## 2022-09-15 ENCOUNTER — TELEPHONE (OUTPATIENT)
Dept: INTERNAL MEDICINE CLINIC | Facility: CLINIC | Age: 57
End: 2022-09-15

## 2022-09-15 NOTE — TELEPHONE ENCOUNTER
To MD:  The above refill request is for a controlled substance. Please review pended medication order. Print and sign for staff to fax to pharmacy or prescribe electronically.     Last office visit: 5/4/2022   Last time refill sent and quantity/refills: 12/22/2021  #90/3   [place]  6[place]18/2022 #90

## 2022-09-16 RX ORDER — CLONAZEPAM 1 MG/1
TABLET ORAL
Qty: 90 TABLET | Refills: 3 | Status: SHIPPED | OUTPATIENT
Start: 2022-09-16

## 2022-10-18 ENCOUNTER — TELEPHONE (OUTPATIENT)
Dept: INTERNAL MEDICINE CLINIC | Facility: CLINIC | Age: 57
End: 2022-10-18

## 2022-10-18 NOTE — TELEPHONE ENCOUNTER
To Dr. Frazier Graft:  Pt requesting Methylprednisolone (pended) for body aches and pains after recent camping/hiking trip. Reporting #5/10 pain; pain in lower back, right shoulder, both knees, and overall some generalized body aches after strenuous hiking. Pain quality is dull/achey/sorenss. No sharp pains; no radiating pains. Has been home for 10 days and usually is given Medrol dose pack from orthopedic physician to help relieve pain/recover after trip; currently in the process of looking for new ortho physician. Taking tylenol, Meloxicam and icing with minimal relief.

## 2022-10-19 RX ORDER — METHYLPREDNISOLONE 4 MG/1
TABLET ORAL
Qty: 21 EACH | Refills: 0 | Status: SHIPPED | OUTPATIENT
Start: 2022-10-19

## 2022-11-14 ENCOUNTER — TELEPHONE (OUTPATIENT)
Dept: INTERNAL MEDICINE CLINIC | Facility: CLINIC | Age: 57
End: 2022-11-14

## 2022-11-14 NOTE — TELEPHONE ENCOUNTER
sxs x 4 d; nares COVID 11/13    S/p COVID vax x 3    Imp- COVID infection    Rec- discussed Paxlovid--> deferred for now    Pt called

## 2022-11-14 NOTE — TELEPHONE ENCOUNTER
COVID triage:     Start of symptoms: Last Thursday symptoms started 11/10/22, + Covid yesterday 11/13/22     Fever: Max 101.5 F last night   []  No fever  [x]  Temperature  [x]  Chills   [x]  Night sweats     Cough:   [x] Productive cough  [] Cough with exertion  [x] Dry cough     Breathing: no  [] Wheezing  [] Pain with deep breathing  [] SOB with exertion  [] SOB at rest  [] Heavy breathing     GI Symptoms:  Eating and drinking ok  [] Diarrhea  [] Nausea  [] Vomiting  [] Abdominal pain  [x] Lack of appetite     Other symptoms:  [x] Sore throat - extremely painful over weekend  [] Difficulty swallowing  [x] Nasal drainage  [x] Nasal congestion  [x] PND  [] Sinus pressure  [] Chest congestion  [x] Ear pain  [x] Body aches  [] Loss of sense of smell   [] Loss of sense of taste  []Conjunctivitis  [x] Headache - headache was terrible yesterday 9/10 today 2/10, seems to be related to fever   [] Fatigue  [] Weakness     [x]OTC Medications: Tylenol, Mucinex     [] Any recent travel? [x] Any sick contacts? Wife was sick and she was positive for Covid earlier last week. 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 He is in a hotel. IIs Paxlovid recommended for him? [facility] [address] [facility]. He is planning to drive home on Wednesday. Instructed patient to continue to monitor their symptoms and call with new or worsening symptoms. Explained that they can ask the answering service to page the doctor on call for them in the evening or over the weekend. Instructed patient to go to the nearest ER or call 911 should they develop chest pain, high fever, shortness of breath or difficulty breathing. Patient verbalized understanding. Explained that they should be quarantined and isolated at home on Days 1-5. Explained that they can resume activities on Days 6-10 should they be fever free, have improvement in their symptoms and wear a well fitted mask at all times. Patient verbalized understanding.

## 2023-03-17 ENCOUNTER — TELEPHONE (OUTPATIENT)
Dept: INTERNAL MEDICINE CLINIC | Facility: CLINIC | Age: 58
End: 2023-03-17

## 2023-03-21 RX ORDER — CLONAZEPAM 1 MG/1
TABLET ORAL
Qty: 90 TABLET | Refills: 3 | Status: SHIPPED | OUTPATIENT
Start: 2023-03-21

## 2023-03-21 RX ORDER — METHYLPREDNISOLONE 4 MG/1
TABLET ORAL
Qty: 21 EACH | Refills: 0 | Status: SHIPPED | OUTPATIENT
Start: 2023-03-21

## 2023-03-21 NOTE — TELEPHONE ENCOUNTER
Left message to call back. What is patient requesting medrol eric for? Need to discuss concerns with nursing. Please route as patient call vs refill on call back if unable to transfer to triage group.

## 2023-03-21 NOTE — TELEPHONE ENCOUNTER
Patient called regarding the Clonazapam    Patient will be out of medication tonight and is very concerned about withdrawal.  Request refill is sent today

## 2023-03-21 NOTE — TELEPHONE ENCOUNTER
Pt returned call  Unable to transfer  Advises he takes the medrol eric for overall pain as needed and that refill can wait for Dr Lorene Lange    only needs clonazepam refill today

## 2023-03-21 NOTE — TELEPHONE ENCOUNTER
ERx sent for Medrol Dose eric, #21 pills    ERx sent for clonazepam 1 mg po at bedtime #90, 3 RF    Please notify pt

## 2023-04-25 RX ORDER — SILODOSIN 8 MG/1
CAPSULE ORAL
Qty: 90 CAPSULE | Refills: 3 | Status: SHIPPED | OUTPATIENT
Start: 2023-04-25

## 2023-04-26 RX ORDER — MELOXICAM 15 MG/1
15 TABLET ORAL DAILY
Qty: 90 TABLET | Refills: 3 | Status: SHIPPED | OUTPATIENT
Start: 2023-04-26

## 2023-08-30 ENCOUNTER — LAB ENCOUNTER (OUTPATIENT)
Dept: LAB | Facility: HOSPITAL | Age: 58
End: 2023-08-30
Payer: COMMERCIAL

## 2023-08-30 ENCOUNTER — HOSPITAL ENCOUNTER (OUTPATIENT)
Dept: GENERAL RADIOLOGY | Facility: HOSPITAL | Age: 58
Discharge: HOME | End: 2023-08-30
Payer: COMMERCIAL

## 2023-08-30 DIAGNOSIS — Z01.818 PREOP TESTING: ICD-10-CM

## 2023-08-30 DIAGNOSIS — Z01.818 PREOPERATIVE EXAMINATION, UNSPECIFIED: Primary | ICD-10-CM

## 2023-08-30 LAB
ALBUMIN SERPL-MCNC: 4 G/DL (ref 3.4–5)
ALBUMIN/GLOB SERPL: 1.4 {RATIO} (ref 1–2)
ALP LIVER SERPL-CCNC: 68 U/L
ALT SERPL-CCNC: 38 U/L
ANION GAP SERPL CALC-SCNC: 5 MMOL/L (ref 0–18)
AST SERPL-CCNC: 23 U/L (ref 15–37)
ATRIAL RATE: 67 BPM
BASOPHILS # BLD AUTO: 0.08 X10(3) UL (ref 0–0.2)
BASOPHILS NFR BLD AUTO: 1.3 %
BILIRUB SERPL-MCNC: 0.6 MG/DL (ref 0.1–2)
BILIRUB UR QL: NEGATIVE
BUN BLD-MCNC: 22 MG/DL (ref 7–18)
BUN/CREAT SERPL: 15.9 (ref 10–20)
CALCIUM BLD-MCNC: 9.6 MG/DL (ref 8.5–10.1)
CHLORIDE SERPL-SCNC: 109 MMOL/L (ref 98–112)
CLARITY UR: CLEAR
CO2 SERPL-SCNC: 29 MMOL/L (ref 21–32)
COLOR UR: YELLOW
CREAT BLD-MCNC: 1.38 MG/DL
DEPRECATED RDW RBC AUTO: 44.2 FL (ref 35.1–46.3)
EGFRCR SERPLBLD CKD-EPI 2021: 60 ML/MIN/1.73M2 (ref 60–?)
EOSINOPHIL # BLD AUTO: 0.14 X10(3) UL (ref 0–0.7)
EOSINOPHIL NFR BLD AUTO: 2.2 %
ERYTHROCYTE [DISTWIDTH] IN BLOOD BY AUTOMATED COUNT: 12.6 % (ref 11–15)
FASTING STATUS PATIENT QL REPORTED: YES
GLOBULIN PLAS-MCNC: 2.9 G/DL (ref 2.8–4.4)
GLUCOSE BLD-MCNC: 92 MG/DL (ref 70–99)
GLUCOSE UR-MCNC: NORMAL MG/DL
HCT VFR BLD AUTO: 45.2 %
HGB BLD-MCNC: 15.6 G/DL
IMM GRANULOCYTES # BLD AUTO: 0.01 X10(3) UL (ref 0–1)
IMM GRANULOCYTES NFR BLD: 0.2 %
INR BLD: 0.97 (ref 0.85–1.16)
KETONES UR-MCNC: NEGATIVE MG/DL
LEUKOCYTE ESTERASE UR QL STRIP.AUTO: NEGATIVE
LYMPHOCYTES # BLD AUTO: 1.77 X10(3) UL (ref 1–4)
LYMPHOCYTES NFR BLD AUTO: 28 %
MCH RBC QN AUTO: 33 PG (ref 26–34)
MCHC RBC AUTO-ENTMCNC: 34.5 G/DL (ref 31–37)
MCV RBC AUTO: 95.6 FL
MONOCYTES # BLD AUTO: 0.45 X10(3) UL (ref 0.1–1)
MONOCYTES NFR BLD AUTO: 7.1 %
NEUTROPHILS # BLD AUTO: 3.87 X10 (3) UL (ref 1.5–7.7)
NEUTROPHILS # BLD AUTO: 3.87 X10(3) UL (ref 1.5–7.7)
NEUTROPHILS NFR BLD AUTO: 61.2 %
NITRITE UR QL STRIP.AUTO: NEGATIVE
OSMOLALITY SERPL CALC.SUM OF ELEC: 299 MOSM/KG (ref 275–295)
P AXIS: 46 DEGREES
P-R INTERVAL: 150 MS
PH UR: 5.5 [PH] (ref 5–8)
PLATELET # BLD AUTO: 251 10(3)UL (ref 150–450)
POTASSIUM SERPL-SCNC: 4.5 MMOL/L (ref 3.5–5.1)
PROT SERPL-MCNC: 6.9 G/DL (ref 6.4–8.2)
PROT UR-MCNC: 20 MG/DL
PROTHROMBIN TIME: 12.9 SECONDS (ref 11.6–14.8)
Q-T INTERVAL: 302 MS
QRS DURATION: 108 MS
QTC CALCULATION (BEZET): 319 MS
R AXIS: 27 DEGREES
RBC # BLD AUTO: 4.73 X10(6)UL
SODIUM SERPL-SCNC: 143 MMOL/L (ref 136–145)
SP GR UR STRIP: >1.03 (ref 1–1.03)
T AXIS: 40 DEGREES
UROBILINOGEN UR STRIP-ACNC: NORMAL
VENTRICULAR RATE: 67 BPM
WBC # BLD AUTO: 6.3 X10(3) UL (ref 4–11)

## 2023-08-30 PROCEDURE — 80053 COMPREHEN METABOLIC PANEL: CPT

## 2023-08-30 PROCEDURE — 85610 PROTHROMBIN TIME: CPT

## 2023-08-30 PROCEDURE — 93010 ELECTROCARDIOGRAM REPORT: CPT | Performed by: INTERNAL MEDICINE

## 2023-08-30 PROCEDURE — 81001 URINALYSIS AUTO W/SCOPE: CPT

## 2023-08-30 PROCEDURE — 71046 X-RAY EXAM CHEST 2 VIEWS: CPT

## 2023-08-30 PROCEDURE — 93005 ELECTROCARDIOGRAM TRACING: CPT

## 2023-08-30 PROCEDURE — 36415 COLL VENOUS BLD VENIPUNCTURE: CPT

## 2023-08-30 PROCEDURE — 85025 COMPLETE CBC W/AUTO DIFF WBC: CPT

## 2023-09-07 ENCOUNTER — OFFICE VISIT (OUTPATIENT)
Dept: INTERNAL MEDICINE CLINIC | Facility: CLINIC | Age: 58
End: 2023-09-07

## 2023-09-07 VITALS
DIASTOLIC BLOOD PRESSURE: 80 MMHG | HEART RATE: 80 BPM | HEIGHT: 76 IN | SYSTOLIC BLOOD PRESSURE: 122 MMHG | WEIGHT: 229 LBS | TEMPERATURE: 98 F | RESPIRATION RATE: 20 BRPM | OXYGEN SATURATION: 99 % | BODY MASS INDEX: 27.89 KG/M2

## 2023-09-07 DIAGNOSIS — I45.10 RBBB: ICD-10-CM

## 2023-09-07 DIAGNOSIS — M48.061 SPINAL STENOSIS OF LUMBAR REGION, UNSPECIFIED WHETHER NEUROGENIC CLAUDICATION PRESENT: Primary | ICD-10-CM

## 2023-09-07 DIAGNOSIS — N40.1 BENIGN PROSTATIC HYPERPLASIA WITH LOWER URINARY TRACT SYMPTOMS, SYMPTOM DETAILS UNSPECIFIED: ICD-10-CM

## 2023-09-07 DIAGNOSIS — Z01.818 PREOP EXAMINATION: ICD-10-CM

## 2023-09-07 DIAGNOSIS — N18.2 STAGE 2 CHRONIC KIDNEY DISEASE: ICD-10-CM

## 2023-09-07 PROCEDURE — 3079F DIAST BP 80-89 MM HG: CPT | Performed by: INTERNAL MEDICINE

## 2023-09-07 PROCEDURE — 3008F BODY MASS INDEX DOCD: CPT | Performed by: INTERNAL MEDICINE

## 2023-09-07 PROCEDURE — 3074F SYST BP LT 130 MM HG: CPT | Performed by: INTERNAL MEDICINE

## 2023-09-07 PROCEDURE — 99214 OFFICE O/P EST MOD 30 MIN: CPT | Performed by: INTERNAL MEDICINE

## 2023-09-07 RX ORDER — HYALURONATE SODIUM 16.8MG/2ML
SYRINGE (ML) INTRAARTICULAR
COMMUNITY
Start: 2023-05-10

## 2023-09-07 RX ORDER — CELECOXIB 200 MG/1
200 CAPSULE ORAL DAILY
COMMUNITY
Start: 2023-06-01

## 2023-09-20 ENCOUNTER — LAB ENCOUNTER (OUTPATIENT)
Dept: LAB | Facility: HOSPITAL | Age: 58
End: 2023-09-20
Attending: INTERNAL MEDICINE
Payer: COMMERCIAL

## 2023-09-20 DIAGNOSIS — Z01.818 PREOP EXAMINATION: ICD-10-CM

## 2023-09-20 DIAGNOSIS — M48.061 SPINAL STENOSIS OF LUMBAR REGION, UNSPECIFIED WHETHER NEUROGENIC CLAUDICATION PRESENT: ICD-10-CM

## 2023-09-20 LAB
ANTIBODY SCREEN: NEGATIVE
MRSA DNA SPEC QL NAA+PROBE: NEGATIVE
RH BLOOD TYPE: POSITIVE
RH BLOOD TYPE: POSITIVE

## 2023-09-20 PROCEDURE — 86900 BLOOD TYPING SEROLOGIC ABO: CPT | Performed by: INTERNAL MEDICINE

## 2023-09-20 PROCEDURE — 36415 COLL VENOUS BLD VENIPUNCTURE: CPT | Performed by: INTERNAL MEDICINE

## 2023-09-20 PROCEDURE — 87641 MR-STAPH DNA AMP PROBE: CPT

## 2023-09-20 PROCEDURE — 86850 RBC ANTIBODY SCREEN: CPT | Performed by: INTERNAL MEDICINE

## 2023-09-20 PROCEDURE — 86901 BLOOD TYPING SEROLOGIC RH(D): CPT | Performed by: INTERNAL MEDICINE

## 2023-09-27 ENCOUNTER — HOSPITAL ENCOUNTER (INPATIENT)
Facility: HOSPITAL | Age: 58
LOS: 1 days | Discharge: HOME | DRG: 455 | End: 2023-09-28
Attending: NEUROLOGICAL SURGERY | Admitting: NEUROLOGICAL SURGERY
Payer: COMMERCIAL

## 2023-09-27 ENCOUNTER — ANESTHESIA (OUTPATIENT)
Dept: SURGERY | Facility: HOSPITAL | Age: 58
DRG: 455 | End: 2023-09-27
Payer: COMMERCIAL

## 2023-09-27 ENCOUNTER — APPOINTMENT (OUTPATIENT)
Dept: GENERAL RADIOLOGY | Facility: HOSPITAL | Age: 58
DRG: 455 | End: 2023-09-27
Attending: NEUROLOGICAL SURGERY
Payer: COMMERCIAL

## 2023-09-27 ENCOUNTER — ANESTHESIA EVENT (OUTPATIENT)
Dept: SURGERY | Facility: HOSPITAL | Age: 58
DRG: 455 | End: 2023-09-27
Payer: COMMERCIAL

## 2023-09-27 PROBLEM — Z98.1 S/P LUMBAR FUSION: Status: ACTIVE | Noted: 2023-09-27

## 2023-09-27 PROBLEM — Z85.820 HISTORY OF MELANOMA: Status: ACTIVE | Noted: 2023-09-27

## 2023-09-27 PROBLEM — M48.062 SPINAL STENOSIS OF LUMBAR REGION WITH NEUROGENIC CLAUDICATION: Status: ACTIVE | Noted: 2023-09-27

## 2023-09-27 PROBLEM — Z86.69 HISTORY OF MIGRAINE: Status: ACTIVE | Noted: 2023-09-27

## 2023-09-27 PROCEDURE — 01NB0ZZ RELEASE LUMBAR NERVE, OPEN APPROACH: ICD-10-PCS | Performed by: NEUROLOGICAL SURGERY

## 2023-09-27 PROCEDURE — 4A11X4G MONITORING OF PERIPHERAL NERVOUS ELECTRICAL ACTIVITY, INTRAOPERATIVE, EXTERNAL APPROACH: ICD-10-PCS | Performed by: NEUROLOGICAL SURGERY

## 2023-09-27 PROCEDURE — 0SG0071 FUSION OF LUMBAR VERTEBRAL JOINT WITH AUTOLOGOUS TISSUE SUBSTITUTE, POSTERIOR APPROACH, POSTERIOR COLUMN, OPEN APPROACH: ICD-10-PCS | Performed by: NEUROLOGICAL SURGERY

## 2023-09-27 PROCEDURE — 0ST20ZZ RESECTION OF LUMBAR VERTEBRAL DISC, OPEN APPROACH: ICD-10-PCS | Performed by: NEUROLOGICAL SURGERY

## 2023-09-27 PROCEDURE — 76000 FLUOROSCOPY <1 HR PHYS/QHP: CPT | Performed by: NEUROLOGICAL SURGERY

## 2023-09-27 PROCEDURE — 0SG00AJ FUSION OF LUMBAR VERTEBRAL JOINT WITH INTERBODY FUSION DEVICE, POSTERIOR APPROACH, ANTERIOR COLUMN, OPEN APPROACH: ICD-10-PCS | Performed by: NEUROLOGICAL SURGERY

## 2023-09-27 DEVICE — TLX20, 11X11X31MM 20°
Type: IMPLANTABLE DEVICE | Site: BACK | Status: FUNCTIONAL
Brand: TLX

## 2023-09-27 DEVICE — SCREW SPNL DIA5.5MM OPN TULIP LOK RELINE: Type: IMPLANTABLE DEVICE | Site: BACK | Status: FUNCTIONAL

## 2023-09-27 DEVICE — ROD SPNL L40MM DIA5.5MM POST: Type: IMPLANTABLE DEVICE | Site: BACK | Status: FUNCTIONAL

## 2023-09-27 DEVICE — SCREW SPNL L50MM OD6.5MM 2C REDUC RELINE: Type: IMPLANTABLE DEVICE | Site: BACK | Status: FUNCTIONAL

## 2023-09-27 DEVICE — OSTEOCEL PRO LARGE BULK BUY: Type: IMPLANTABLE DEVICE | Site: BACK | Status: FUNCTIONAL

## 2023-09-27 DEVICE — BONE GRAFT KIT 7510200 INFUSE SMALL
Type: IMPLANTABLE DEVICE | Site: BACK | Status: FUNCTIONAL
Brand: INFUSE® BONE GRAFT

## 2023-09-27 RX ORDER — HYDROMORPHONE HYDROCHLORIDE 1 MG/ML
0.4 INJECTION, SOLUTION INTRAMUSCULAR; INTRAVENOUS; SUBCUTANEOUS EVERY 5 MIN PRN
Status: DISCONTINUED | OUTPATIENT
Start: 2023-09-27 | End: 2023-09-27 | Stop reason: HOSPADM

## 2023-09-27 RX ORDER — ONDANSETRON 2 MG/ML
4 INJECTION INTRAMUSCULAR; INTRAVENOUS EVERY 6 HOURS PRN
Status: DISCONTINUED | OUTPATIENT
Start: 2023-09-27 | End: 2023-09-27 | Stop reason: HOSPADM

## 2023-09-27 RX ORDER — LIDOCAINE HYDROCHLORIDE 10 MG/ML
INJECTION, SOLUTION EPIDURAL; INFILTRATION; INTRACAUDAL; PERINEURAL AS NEEDED
Status: DISCONTINUED | OUTPATIENT
Start: 2023-09-27 | End: 2023-09-27 | Stop reason: SURG

## 2023-09-27 RX ORDER — ACETAMINOPHEN 500 MG
1000 TABLET ORAL ONCE
Status: COMPLETED | OUTPATIENT
Start: 2023-09-27 | End: 2023-09-27

## 2023-09-27 RX ORDER — SENNOSIDES 8.6 MG
17.2 TABLET ORAL NIGHTLY
Status: DISCONTINUED | OUTPATIENT
Start: 2023-09-27 | End: 2023-09-28

## 2023-09-27 RX ORDER — POLYETHYLENE GLYCOL 3350 17 G/17G
17 POWDER, FOR SOLUTION ORAL DAILY PRN
Status: DISCONTINUED | OUTPATIENT
Start: 2023-09-27 | End: 2023-09-28

## 2023-09-27 RX ORDER — MORPHINE SULFATE 4 MG/ML
4 INJECTION, SOLUTION INTRAMUSCULAR; INTRAVENOUS EVERY 10 MIN PRN
Status: DISCONTINUED | OUTPATIENT
Start: 2023-09-27 | End: 2023-09-27 | Stop reason: HOSPADM

## 2023-09-27 RX ORDER — DEXAMETHASONE SODIUM PHOSPHATE 4 MG/ML
VIAL (ML) INJECTION AS NEEDED
Status: DISCONTINUED | OUTPATIENT
Start: 2023-09-27 | End: 2023-09-27 | Stop reason: SURG

## 2023-09-27 RX ORDER — ENEMA 19; 7 G/133ML; G/133ML
1 ENEMA RECTAL ONCE AS NEEDED
Status: DISCONTINUED | OUTPATIENT
Start: 2023-09-27 | End: 2023-09-28

## 2023-09-27 RX ORDER — HYDROCODONE BITARTRATE AND ACETAMINOPHEN 5; 325 MG/1; MG/1
1 TABLET ORAL EVERY 4 HOURS PRN
Status: DISCONTINUED | OUTPATIENT
Start: 2023-09-27 | End: 2023-09-28

## 2023-09-27 RX ORDER — DIAZEPAM 5 MG/1
5 TABLET ORAL 2 TIMES DAILY PRN
Status: DISCONTINUED | OUTPATIENT
Start: 2023-09-27 | End: 2023-09-28

## 2023-09-27 RX ORDER — METHOCARBAMOL 750 MG/1
750 TABLET, FILM COATED ORAL 3 TIMES DAILY PRN
Status: DISCONTINUED | OUTPATIENT
Start: 2023-09-27 | End: 2023-09-28

## 2023-09-27 RX ORDER — SODIUM CHLORIDE, SODIUM LACTATE, POTASSIUM CHLORIDE, CALCIUM CHLORIDE 600; 310; 30; 20 MG/100ML; MG/100ML; MG/100ML; MG/100ML
INJECTION, SOLUTION INTRAVENOUS CONTINUOUS
Status: DISCONTINUED | OUTPATIENT
Start: 2023-09-27 | End: 2023-09-28

## 2023-09-27 RX ORDER — HYDROMORPHONE HYDROCHLORIDE 1 MG/ML
0.2 INJECTION, SOLUTION INTRAMUSCULAR; INTRAVENOUS; SUBCUTANEOUS EVERY 5 MIN PRN
Status: DISCONTINUED | OUTPATIENT
Start: 2023-09-27 | End: 2023-09-27 | Stop reason: HOSPADM

## 2023-09-27 RX ORDER — PROCHLORPERAZINE EDISYLATE 5 MG/ML
5 INJECTION INTRAMUSCULAR; INTRAVENOUS EVERY 8 HOURS PRN
Status: DISCONTINUED | OUTPATIENT
Start: 2023-09-27 | End: 2023-09-28

## 2023-09-27 RX ORDER — BISACODYL 10 MG
10 SUPPOSITORY, RECTAL RECTAL
Status: DISCONTINUED | OUTPATIENT
Start: 2023-09-27 | End: 2023-09-28

## 2023-09-27 RX ORDER — ONDANSETRON 2 MG/ML
INJECTION INTRAMUSCULAR; INTRAVENOUS AS NEEDED
Status: DISCONTINUED | OUTPATIENT
Start: 2023-09-27 | End: 2023-09-27 | Stop reason: SURG

## 2023-09-27 RX ORDER — CEFAZOLIN SODIUM/WATER 2 G/20 ML
2 SYRINGE (ML) INTRAVENOUS EVERY 8 HOURS
Status: COMPLETED | OUTPATIENT
Start: 2023-09-27 | End: 2023-09-28

## 2023-09-27 RX ORDER — HYDROMORPHONE HYDROCHLORIDE 1 MG/ML
0.6 INJECTION, SOLUTION INTRAMUSCULAR; INTRAVENOUS; SUBCUTANEOUS EVERY 5 MIN PRN
Status: DISCONTINUED | OUTPATIENT
Start: 2023-09-27 | End: 2023-09-27 | Stop reason: HOSPADM

## 2023-09-27 RX ORDER — DIPHENHYDRAMINE HCL 25 MG
25 CAPSULE ORAL EVERY 4 HOURS PRN
Status: DISCONTINUED | OUTPATIENT
Start: 2023-09-27 | End: 2023-09-28

## 2023-09-27 RX ORDER — PROCHLORPERAZINE EDISYLATE 5 MG/ML
5 INJECTION INTRAMUSCULAR; INTRAVENOUS EVERY 8 HOURS PRN
Status: DISCONTINUED | OUTPATIENT
Start: 2023-09-27 | End: 2023-09-27 | Stop reason: HOSPADM

## 2023-09-27 RX ORDER — HYDROMORPHONE HYDROCHLORIDE 1 MG/ML
0.2 INJECTION, SOLUTION INTRAMUSCULAR; INTRAVENOUS; SUBCUTANEOUS EVERY 2 HOUR PRN
Status: DISCONTINUED | OUTPATIENT
Start: 2023-09-27 | End: 2023-09-28

## 2023-09-27 RX ORDER — HYDROMORPHONE HYDROCHLORIDE 1 MG/ML
0.4 INJECTION, SOLUTION INTRAMUSCULAR; INTRAVENOUS; SUBCUTANEOUS EVERY 2 HOUR PRN
Status: DISCONTINUED | OUTPATIENT
Start: 2023-09-27 | End: 2023-09-28

## 2023-09-27 RX ORDER — NALOXONE HYDROCHLORIDE 0.4 MG/ML
0.08 INJECTION, SOLUTION INTRAMUSCULAR; INTRAVENOUS; SUBCUTANEOUS AS NEEDED
Status: DISCONTINUED | OUTPATIENT
Start: 2023-09-27 | End: 2023-09-27 | Stop reason: HOSPADM

## 2023-09-27 RX ORDER — DOCUSATE SODIUM 100 MG/1
100 CAPSULE, LIQUID FILLED ORAL 2 TIMES DAILY
Status: DISCONTINUED | OUTPATIENT
Start: 2023-09-27 | End: 2023-09-28

## 2023-09-27 RX ORDER — DIPHENHYDRAMINE HYDROCHLORIDE 50 MG/ML
25 INJECTION INTRAMUSCULAR; INTRAVENOUS EVERY 4 HOURS PRN
Status: DISCONTINUED | OUTPATIENT
Start: 2023-09-27 | End: 2023-09-28

## 2023-09-27 RX ORDER — MIDAZOLAM HYDROCHLORIDE 1 MG/ML
INJECTION INTRAMUSCULAR; INTRAVENOUS AS NEEDED
Status: DISCONTINUED | OUTPATIENT
Start: 2023-09-27 | End: 2023-09-27 | Stop reason: SURG

## 2023-09-27 RX ORDER — TAMSULOSIN HYDROCHLORIDE 0.4 MG/1
0.4 CAPSULE ORAL DAILY
Status: DISCONTINUED | OUTPATIENT
Start: 2023-09-27 | End: 2023-09-28

## 2023-09-27 RX ORDER — ONDANSETRON 2 MG/ML
4 INJECTION INTRAMUSCULAR; INTRAVENOUS EVERY 6 HOURS PRN
Status: DISCONTINUED | OUTPATIENT
Start: 2023-09-27 | End: 2023-09-28

## 2023-09-27 RX ORDER — MORPHINE SULFATE 4 MG/ML
2 INJECTION, SOLUTION INTRAMUSCULAR; INTRAVENOUS EVERY 10 MIN PRN
Status: DISCONTINUED | OUTPATIENT
Start: 2023-09-27 | End: 2023-09-27 | Stop reason: HOSPADM

## 2023-09-27 RX ORDER — BUPIVACAINE HYDROCHLORIDE AND EPINEPHRINE 2.5; 5 MG/ML; UG/ML
INJECTION, SOLUTION INFILTRATION; PERINEURAL AS NEEDED
Status: DISCONTINUED | OUTPATIENT
Start: 2023-09-27 | End: 2023-09-27 | Stop reason: HOSPADM

## 2023-09-27 RX ORDER — SODIUM CHLORIDE, SODIUM LACTATE, POTASSIUM CHLORIDE, CALCIUM CHLORIDE 600; 310; 30; 20 MG/100ML; MG/100ML; MG/100ML; MG/100ML
INJECTION, SOLUTION INTRAVENOUS CONTINUOUS
Status: DISCONTINUED | OUTPATIENT
Start: 2023-09-27 | End: 2023-09-27 | Stop reason: HOSPADM

## 2023-09-27 RX ORDER — ROCURONIUM BROMIDE 10 MG/ML
INJECTION, SOLUTION INTRAVENOUS AS NEEDED
Status: DISCONTINUED | OUTPATIENT
Start: 2023-09-27 | End: 2023-09-27 | Stop reason: SURG

## 2023-09-27 RX ORDER — MORPHINE SULFATE 10 MG/ML
6 INJECTION, SOLUTION INTRAMUSCULAR; INTRAVENOUS EVERY 10 MIN PRN
Status: DISCONTINUED | OUTPATIENT
Start: 2023-09-27 | End: 2023-09-27 | Stop reason: HOSPADM

## 2023-09-27 RX ORDER — HYDROCODONE BITARTRATE AND ACETAMINOPHEN 10; 325 MG/1; MG/1
1 TABLET ORAL EVERY 4 HOURS PRN
Status: DISCONTINUED | OUTPATIENT
Start: 2023-09-27 | End: 2023-09-28

## 2023-09-27 RX ORDER — CEFAZOLIN SODIUM/WATER 2 G/20 ML
2 SYRINGE (ML) INTRAVENOUS ONCE
Status: COMPLETED | OUTPATIENT
Start: 2023-09-27 | End: 2023-09-27

## 2023-09-27 RX ADMIN — MIDAZOLAM HYDROCHLORIDE 2 MG: 1 INJECTION INTRAMUSCULAR; INTRAVENOUS at 14:05:00

## 2023-09-27 RX ADMIN — ROCURONIUM BROMIDE 50 MG: 10 INJECTION, SOLUTION INTRAVENOUS at 16:10:00

## 2023-09-27 RX ADMIN — CEFAZOLIN SODIUM/WATER 2 G: 2 G/20 ML SYRINGE (ML) INTRAVENOUS at 14:14:00

## 2023-09-27 RX ADMIN — LIDOCAINE HYDROCHLORIDE 50 MG: 10 INJECTION, SOLUTION EPIDURAL; INFILTRATION; INTRACAUDAL; PERINEURAL at 14:08:00

## 2023-09-27 RX ADMIN — SODIUM CHLORIDE, SODIUM LACTATE, POTASSIUM CHLORIDE, CALCIUM CHLORIDE: 600; 310; 30; 20 INJECTION, SOLUTION INTRAVENOUS at 16:35:00

## 2023-09-27 RX ADMIN — ONDANSETRON 4 MG: 2 INJECTION INTRAMUSCULAR; INTRAVENOUS at 14:08:00

## 2023-09-27 RX ADMIN — ROCURONIUM BROMIDE 10 MG: 10 INJECTION, SOLUTION INTRAVENOUS at 14:08:00

## 2023-09-27 RX ADMIN — DEXAMETHASONE SODIUM PHOSPHATE 4 MG: 4 MG/ML VIAL (ML) INJECTION at 14:08:00

## 2023-09-27 NOTE — ANESTHESIA POSTPROCEDURE EVALUATION
Patient: Chivo Hamm    Procedure Summary       Date: 09/27/23 Room / Location: [address] [place] [address] MAIN OR 09 / [address] [place] [address] MAIN OR    Anesthesia Start: 9163 Anesthesia Stop: 2069    Procedures:       L3-4 and L4-5 laminectomy for resection of extradural cyst and L4-5 right transforaminal lumbar interbody fusion (Spine Lumbar)      POSTERIOR LUMBAR INTERBODY FUSION - MIS TLIF 1 LEVEL (Right: Spine Lumbar) Diagnosis: (SPinal stenosis lumbar with neurogenic claudication)    Surgeons: Jailyn Muñiz MD Anesthesiologist: Jeenlle Durbin DO    Anesthesia Type: general ASA Status: 2            Anesthesia Type: general    Vitals Value Taken Time   /81 09/27/23 1703   Temp 97.8 09/27/23 1706   Pulse 60 09/27/23 1705   Resp 21 09/27/23 1705   SpO2 97 % 09/27/23 1705   Vitals shown include unfiled device data.     [address] [place] [address] AN Post Evaluation:   Patient Evaluated in PACU  Patient Participation: complete - patient participated  Level of Consciousness: awake  Pain Score: 0  Pain Management: adequate  Airway Patency:patent  Dental exam unchanged from preop  Yes    Cardiovascular Status: hemodynamically stable  Respiratory Status: spontaneous ventilation, unassisted, nonlabored ventilation, airway suctioned and room air  Postoperative Hydration stable      Diamond Castro DO  9/27/2023 5:06 PM

## 2023-09-27 NOTE — PLAN OF CARE
Bel Haq M.D., F.A.A.N.S.  of [address] [address]  Board Certified Neurosurgeon                                     Tamika Suarez 23 Neurosurgery        [facility] [address], [address] [place] [facility]    PHONE  [phone]    Kirktn           9/27/2023  1:06 PM    PRE-OPERATIVE [address] was again informed of the nature of the problem, planned treatment, indications and alternatives. We again reviewed the expected benefits of surgery, as well as all reasonably foreseeable risks, including but not limited to infection, bleeding requiring transfusion or reoperation, no relief or worsening of the pain, numbness, weakness, need for assistive devices to get around, injury to the nerves, paralysis, loss of control of the legs/bladder/bowel/sexual function, spinal fluid leak, scar formation, failure of the fusion to heal (made more likely with smoking,) bad position of the screws or cages, migration of the screws or cages, break of the screws or rods, problems above or below the level of fusion due to increased stress on those levels from the fusion and leading to more pain and possibly the need for more surgery, heart attack, blood clot formation, pulmonary embolism, stroke, coma and death. his questions were all answered and he understands what we discussed. he also understands that no guarantees can be made regarding outcome or results. Lisa Orellana agrees the benefits of surgery outweigh the risks, and wishes to proceed with surgery.       Raudel Baxter M.D., F.A.A.N.S.

## 2023-09-27 NOTE — INTERVAL H&P NOTE
Pre-op Diagnosis: SPinal stenosis lumbar with neurogenic claudication    The above referenced H&P was reviewed by Martha Borjas MD on 9/27/2023, the patient was examined and no significant changes have occurred in the patient's condition since the H&P was performed. I discussed with the patient and/or legal representative the potential benefits, risks and side effects of this procedure; the likelihood of the patient achieving goals; and potential problems that might occur during recuperation. I discussed reasonable alternatives to the procedure, including risks, benefits and side effects related to the alternatives and risks related to not receiving this procedure. We will proceed with procedure as planned.

## 2023-09-27 NOTE — DISCHARGE INSTRUCTIONS
DISCHARGE INSTRUCTIONS PER DR. Abreu [facility]    Wound Care:   No dressing on the incision necessary. Dab dry the incision after shower/cleaning. No tub baths until first post-op follow-up. Activity:   Resume activity as tolerated. Walking as much as possible is highly encouraged. Limit bending, lifting and twisting. Do not drive while taking narcotic pain medications. Post-Op Medications:   Medications sent to the pharmacy. Take medications as directed. Continue icing of the area of the incision for 30 minutes at a time, multiple   times a day. If a cooling device (e.g. Polar Care) has been provided, utilize as directed per nursing staff. Diet:   Advance diet as tolerated. - ambulate with a walker  - no bending, lifting, or twisting  - take pain medications as needed for pain  - ice as needed  - while taking pain medications, take a stool softener or laxative to keep bowel movements regular  - use incentive spirometer 10x per hour  - monitor for signs of infection (fever, chills, nausea, vomiting, increased pain, swelling, redness, odor)  - contact office with additional questions      Follow-up:  Please have patient follow-up in my clinic 2 weeks after surgery. Please have patient call [phone] to verify first post-op appointment.

## 2023-09-27 NOTE — OPERATIVE REPORT
OPERATIVE REPORT    PATIENT NAME: Pratibha Hamm    DATE OF OPERATION:  9/27/2023    PREOPERATIVE DIAGNOSIS:  1. Lumbar stenosis with neurogenic claudication             2. Lumbar extradural cyst       3. Lumbar spondylolisthesis    POSTOPERATIVE DIAGNOSIS: 1. same               2. Same       3. same      OPERATIVE PROCEDURE:  1.  Lumbar 4 through 5 transforaminal intervertebral arthrodesis and fusion, utilizing titanium interbodies/cages and allograft and locally-harvested morselized autograft  2. Lumbar 3-4 laminectomy for resection of right extradural csytic lesion, L4-5 laminectomy and complete right facetectomy  3. Lumbar 4 through 5 pedicle screw and hosea instrumentation for augmentation of fusion purposes. 4.  Real time intraoperative fluoroscopy and C-arm with the image guidance. 5.  Real time intraoperative motor-evoked potentials, SSEP and nerve monitoring. SURGEON:  Brittany Wynn M.D.    ASSISTANT: Regine Hogue PA-C    ANESTHESIA: General endotracheal anesthesia with TIVA and local anesthetic. ESTIMATED BLOOD LOSS: 25 cc    INTRAVENOUS FLUIDS AND URINE OUTPUT: Per anesthesia sheet    TRANSFUSION: None    IMPLANTS: Nuva    DRAIN: none    SPECIMEN: right L3-4 extradural cystic lesion adjacent to L3-4 facet on the right    COMPLICATIONS: none    PROCEDURE:    After informed consent was obtained, the patient was brought to the operating room. After the uneventful induction of general endotracheal anesthesia and placement of the Ritter catheter, electrodes and monitoring devices were placed. The patient was then positioned on the operating room table, an open-frame Terrance Table, in the prone position. The arms were supported and the neck physiologically extended. All pressure points were adequately padded. The patient's eyes were protected from exposure to prolonged pressure. Baseline electrophysiological potentials were obtained.  Subsequently, we utilized lateral and AP fluoroscopic guidance to identify our incision sites relative to the lumbar bony anatomy corresponding to the correct level(s). We identified the incision sites for our tubular retractor placement as well as the percutaneous screws, contralaterally. They were marked out on the skin. The patient was prepped and draped sterilely. The C-arm was also draped sterilely into the field. Time-Out was done in the proper fashion. Perioperative antibiosis was given within one hour of incision. After the \"Time-Out\", we infiltrated the incisions with our usual local anesthetic. We incised utilizing a 10-blade scalpel. The subcutaneous tissues were opened with Bovie cautery down to the fascia. The fascia was opened sharply using both sharp and blunt dissection. We then used sequential dilators to place a tubular retractor at the L4-5 level along the right laminofacet junction. This was done with fluoroscopic guidance. The tubular retractor was then connected to a table-mounted arm for added stability. The soft tissues overlying the laminofacet junction were resected utilizing mono- and bipolar electrocautery. We then used a high-speed jaswinder the create an ipsilateral laminectomy, undercutting the lamina contralaterally in order to accomplish a generous central decompression. The medial ispilateral facet was then drilled in order to decompress the lateral recess. A high-speed jaswinder was then utilized to complete the generous laminectomy and the right L4 pars was then transected perpendicularly. This detached the L4 articulating facet en-bloc. We then drilled down the corresponding L5 articulating surface to expose the L4-5 intervertebral space and disc. The rest of the bony decompression was accomplished utilizing Kerrison rongeurs. After the soft tissues, including the ligamentum flavum, had been resected, we visualized the ipsilaterally exiting L4 and the en-passage L5 nerve roots verifying adequate decompression.  The disc space was then re-identified and some epidural veins around the disc space were coagulated and cut sharply with the microscissors. The disc space was incised and a radical discectomy was performed using a series of Ricki and [employer], extending across the midline with angled curettes. The cartilaginous endplates were then removed using a series of curettes, rongeurs, rasps, and raphael, removing the cartilaginous endplates and roughing up the bony endplates to get good bleeding bone along both surfaces for fusion purposes. This was inspected with direct visualization and fluoroscopy repeatedly. The wound was copiously irrigated. The nerves were maintained safe and protected with continued direct visualization. No additional disc or cartilaginous endplate was encountered and very good bleeding bony endplates were seen. We trialed for the properly-sized intervertebral cage and then filled it with allograft. About 9 cc of Osteocel Plus with some of the drilled autologous bone were impacted along the ventral aspect of the disc space and also to the right side of the disc space utilizing a funnel applicator and graft delivery system. While gently and carefully retracting the en-passage nerve root medially, the cage was impacted into position and 15 % expanded to proper alignment. Approximately, 12 degrees of segmental lordosis were gained. Fluoroscopy confirmed good positioning of the cage. We irrigated the surgical site copiously and hemostasis was obtained with bipolar electrocautery and Flowseal. We then decorticated the ipsilateral posterolateral elements generously extending from L4 to L5 with the high-speed jaswinder. The rest of our harvested autograft which had been denuded off all soft tissue and morselized was then packed into the posterolateral space and gutter extending from L4 through L5 for posterolateral arthrodesis purposes.  The tubular retractor was then slowly removed, verifying hemostasis at every point of the way meticulously with bipolar electrocautery. We then performed a similar sequence of events between L3 and L4 with a diolation to the laminofacet junction, decompression via resection of the ipsilateral lamina, medial  L3-4 facetectomy. We encountered a large extradural cystic lesion which was impinging on the right L4 nerve root. We slowly dissected the lesion from the dura carefully en bloc and then we sent it for path evaluation. This took at leats 30 minutes and we had to work slowly to prevent any injury to the dura. We were able to entirely decompress the dura at this level prior to removing the tubular retractor there. Next, after the corresponding incisions were opened with a 10-blade, a monitored Jamshidi needle was used to cannulate the pedicles from L4 to L5 in the traditional transpedicular fashion. We verified electrophysiological readings at good thresholds. The trajectories were fluoroscopically confirmed. Subsequently, the screws with their extenders were placed over guidewires utilizing fluoroscopic guidance. Finally, we tested and stimulated all screws electrophysiologically at appropriate thresholds. At this point in the procedure we proceeded to finish the instrumentation. In cases where a segmental spondylolisthesis was present, we asked our anesthesia colleagues now to provide pharmacological paralysis so that the listhesis would be easier to reduce. Utilizing calipers within the screw extenders, we measured for the properly-sized lordotic rods and placed them appropriately. These were placed through the guide channels of the extenders and easily placed into the screw heads. Locking nuts were provisionally placed. Then, using the torque  and the counter-torque device, each end of the hosea was secured into the tulip heads of the screws, compressing or reducing the construct lightly before tightening the rods.   The locking nuts were again revisited with the torque  to confirm tightness. The screw extenders were then removed and final fluoroscopic images documented satisfactory position of the cage, screws, and rods. The wound was copiously irrigated and small bleeding points were controlled with a bipolar electrocautery. The construct was again inspected and found to be quite satisfactory under direct visualization. Still, more irrigation was used and the lumbodorsal fascia was closed using a series of interrupted 0 Vicryl sutures. The subcutaneous tissues were copiously irrigated and closed with an interrupted inverted 3-0 Vicryl suture. The skin was closed with 4-0 Vicryl and Dermabond. There were no complications. All counts were reported correct. The nerve stimulation of the screws achieved satisfactory thresholds including final placement of the instrumentation. The patient was returned to the supine position, extubated in the operating room, and taken to the recovery room in satisfactory condition, having tolerated the procedure well and moving all fours strongly to command. Please fax a copy of this report to my office at [phone] and the Primary Care Provider of this patient.

## 2023-09-27 NOTE — PLAN OF CARE
Rayne Severance is POD 0. Alert x 4. Dressing in place to back. WBAT when up. Receiving IVF. Ritter in place. SCDs for DVT prophylaxis. VSS. No acute changes noted throughout shift. Tolerating diet. Frequent ambulation with RN or PCT encouraged. Cough & deep breathe in addition to use of I.S. Fall precautions in place- bed alarm on, bed in lowest position, call light and personal belongings within reach, non-skid socks in place. Frequent rounding by nursing staff. Plan is PT/OT, nrsg and medical clearance.      Problem: Patient Centered Care  Goal: Patient preferences are identified and integrated in the patient's plan of care  Description: Interventions:  - What would you like us to know as we care for you?   - Provide timely, complete, and accurate information to patient/family  - Incorporate patient and family knowledge, values, beliefs, and cultural backgrounds into the planning and delivery of care  - Encourage patient/family to participate in care and decision-making at the level they choose  - Honor patient and family perspectives and choices  Outcome: Progressing     Problem: Patient/Family Goals  Goal: Patient/Family Long Term Goal  Description: Patient's Long Term Goal:     Interventions:  -   - See additional Care Plan goals for specific interventions  Outcome: Progressing  Goal: Patient/Family Short Term Goal  Description: Patient's Short Term Goal:     Interventions:   -   - See additional Care Plan goals for specific interventions  Outcome: Progressing

## 2023-09-27 NOTE — ANESTHESIA PROCEDURE NOTES
Airway  Date/Time: 9/27/2023 2:10 PM  Urgency: Elective    Airway not difficult    General Information and Staff    Patient location during procedure: OR  Anesthesiologist: Renae Tate DO  Resident/CRNA: Amado Greenwood CRNA  Performed: CRNA   Performed by: Amado Greenwood CRNA  Authorized by: Renae Tate DO      Indications and Patient Condition  Indications for airway management: anesthesia  Sedation level: deep  Preoxygenated: yes  Patient position: sniffing  Mask difficulty assessment: 1 - vent by mask    Final Airway Details  Final airway type: endotracheal airway      Successful airway: ETT  Cuffed: yes   Successful intubation technique: direct laryngoscopy  Endotracheal tube insertion site: oral  Blade: Cedrick  Blade size: #4  ETT size (mm): 7.0    Cormack-Lehane Classification: grade I - full view of glottis  Placement verified by: capnometry   Measured from: teeth  ETT to teeth (cm): 22  Number of attempts at approach: 1    Additional Comments  ATRAUAMATIC / [id number] [facility] [place],[address]

## 2023-09-28 VITALS
WEIGHT: 229 LBS | BODY MASS INDEX: 27.89 KG/M2 | OXYGEN SATURATION: 98 % | DIASTOLIC BLOOD PRESSURE: 78 MMHG | HEART RATE: 89 BPM | SYSTOLIC BLOOD PRESSURE: 157 MMHG | TEMPERATURE: 99 F | HEIGHT: 76 IN | RESPIRATION RATE: 18 BRPM

## 2023-09-28 PROCEDURE — 99235 HOSP IP/OBS SAME DATE MOD 70: CPT | Performed by: INTERNAL MEDICINE

## 2023-09-28 RX ORDER — HYDROCODONE BITARTRATE AND ACETAMINOPHEN 5; 325 MG/1; MG/1
1 TABLET ORAL EVERY 4 HOURS PRN
Qty: 42 TABLET | Refills: 0 | Status: SHIPPED | OUTPATIENT
Start: 2023-09-28 | End: 2023-10-05

## 2023-09-28 RX ORDER — METHOCARBAMOL 750 MG/1
750 TABLET, FILM COATED ORAL 3 TIMES DAILY PRN
Qty: 30 TABLET | Refills: 0 | Status: SHIPPED | OUTPATIENT
Start: 2023-09-28 | End: 2023-10-08

## 2023-09-28 RX ORDER — METHOCARBAMOL 750 MG/1
750 TABLET, FILM COATED ORAL 3 TIMES DAILY PRN
Qty: 30 TABLET | Refills: 0 | Status: SHIPPED | OUTPATIENT
Start: 2023-09-28 | End: 2023-09-28

## 2023-09-28 RX ORDER — HYDROCODONE BITARTRATE AND ACETAMINOPHEN 10; 325 MG/1; MG/1
1 TABLET ORAL EVERY 6 HOURS PRN
Qty: 28 TABLET | Refills: 0 | Status: SHIPPED | OUTPATIENT
Start: 2023-09-28 | End: 2023-09-28

## 2023-09-28 NOTE — PLAN OF CARE
Patient is POD 1. Gel ice. Dermabond MAURICIO inc to lower back. Aox4 on RA. SCDs ion bed. Check void. General diet. IV ancef given. Norco, Robaxin and Dilaudid given for pain.  Plan for PT/OT    Problem: Patient Centered Care  Goal: Patient preferences are identified and integrated in the patient's plan of care  Description: Interventions:  - What would you like us to know as we care for you?   - Provide timely, complete, and accurate information to patient/family  - Incorporate patient and family knowledge, values, beliefs, and cultural backgrounds into the planning and delivery of care  - Encourage patient/family to participate in care and decision-making at the level they choose  - Honor patient and family perspectives and choices  Outcome: Progressing     Problem: Patient/Family Goals  Goal: Patient/Family Long Term Goal  Description: Patient's Long Term Goal: Go home    Interventions:  - therapy  - See additional Care Plan goals for specific interventions  Outcome: Progressing  Goal: Patient/Family Short Term Goal  Description: Patient's Short Term Goal: No pain    Interventions:   - pain medicine  - gel ice therapy  - See additional Care Plan goals for specific interventions  Outcome: Progressing

## 2023-09-28 NOTE — PLAN OF CARE
Post-op day #1. Dressing in place to lower back. Monitoring vital signs- stable at this time. No acute changes noted throughout shift. Voiding freely. Patient is on room air. SCDs for DVT prophylaxis. Pain medication- norco and robaxin provided as needed. Up with standby assist and a walker. Encouraged frequent ambulation and use of incentive spirometer. Fall precautions maintained- bed alarm on, bed locked in lowest position, call light and personal belongings within reach, non-skid socks in place to bilateral feet. Frequent rounding by nursing staff. Plan for discharge home. Patient cleared by internal medicine, ortho surgery, PT/OT. Going home with wife. IV removed, discharge education provided, patient sent home with all personal belongings, and discharge instructions. Addressed additional questions.       Problem: Patient/Family Goals  Goal: Patient/Family Long Term Goal  Description: Patient's Long Term Goal: Discharge to home    Interventions:  - Discharge planning, pain management, follow orders, monitor labs and vitals, update pt and family on plan of care  - See additional Care Plan goals for specific interventions  Outcome: Adequate for Discharge  Goal: Patient/Family Short Term Goal  Description: Patient's Short Term Goal: Pain management    Interventions:   - pain medications as needed, ambulate, PT/OT, non-pharmacological pain interventions  - See additional Care Plan goals for specific interventions  Outcome: Adequate for Discharge     Problem: PAIN - ADULT  Goal: Verbalizes/displays adequate comfort level or patient's stated pain goal  Description: INTERVENTIONS:  - Encourage pt to monitor pain and request assistance  - Assess pain using appropriate pain scale  - Administer analgesics based on type and severity of pain and evaluate response  - Implement non-pharmacological measures as appropriate and evaluate response  - Consider cultural and social influences on pain and pain management  - Manage/alleviate anxiety  - Utilize distraction and/or relaxation techniques  - Monitor for opioid side effects  - Notify MD/LIP if interventions unsuccessful or patient reports new pain  - Anticipate increased pain with activity and pre-medicate as appropriate  Outcome: Adequate for Discharge     Problem: RISK FOR INFECTION - ADULT  Goal: Absence of fever/infection during anticipated neutropenic period  Description: INTERVENTIONS  - Monitor WBC  - Administer growth factors as ordered  - Implement neutropenic guidelines  Outcome: Adequate for Discharge     Problem: SAFETY ADULT - FALL  Goal: Free from fall injury  Description: INTERVENTIONS:  - Assess pt frequently for physical needs  - Identify cognitive and physical deficits and behaviors that affect risk of falls.   - [place] fall precautions as indicated by assessment.  - Educate pt/family on patient safety including physical limitations  - Instruct pt to call for assistance with activity based on assessment  - Modify environment to reduce risk of injury  - Provide assistive devices as appropriate  - Consider OT/PT consult to assist with strengthening/mobility  - Encourage toileting schedule  Outcome: Adequate for Discharge     Problem: DISCHARGE PLANNING  Goal: Discharge to home or other facility with appropriate resources  Description: INTERVENTIONS:  - Identify barriers to discharge w/pt and caregiver  - Include patient/family/discharge partner in discharge planning  - Arrange for needed discharge resources and transportation as appropriate  - Identify discharge learning needs (meds, wound care, etc)  - Arrange for interpreters to assist at discharge as needed  - Consider post-discharge preferences of patient/family/discharge partner  - Complete POLST form as appropriate  - Assess patient's ability to be responsible for managing their own health  - Refer to Case Management Department for coordinating discharge planning if the patient needs post-hospital services based on physician/LIP order or complex needs related to functional status, cognitive ability or social support system  Outcome: Adequate for Discharge     Problem: Impaired Functional Mobility  Goal: Achieve highest/safest level of mobility/gait  Description: Interventions:  - Assess patient's functional ability and stability  - Promote increasing activity/tolerance for mobility and gait  - Educate and engage patient/family in tolerated activity level and precautions  Outcome: Adequate for Discharge

## 2023-09-28 NOTE — PHYSICAL THERAPY NOTE
PHYSICAL THERAPY EVALUATION - INPATIENT     Room Number: 985/163-P  Evaluation Date: 9/28/2023  Type of Evaluation: Initial   Physician Order: PT Eval and Treat    Presenting Problem: S/P L4-5 laminectomy, fusion  Co-Morbidities : BPH, chronic neurogenic claudication 2/2 lumbar stenosis, cervical radiculopathy, cancer, migraines, HTN, S/P L TSA 2011  Reason for Therapy: Mobility Dysfunction and Discharge Planning    PHYSICAL THERAPY ASSESSMENT     Patient is a 63year old male admitted 9/27/2023 S/P L4-5 laminectomy/fusion. Patient received semi-fowlers in bed, agreeable to therapy evaluation. Vital signs monitored as noted below, no adverse symptoms and patient stable during session. Patient demonstrates independence with a rolling walker and short distances without a rolling walker with functional mobility skills as noted below. Pt tolerating community distances of gait. Pt educated on spinal precautions, body mechanics, and home safety with healing, verbalizing understanding and appropriately maintaining spinal precautions throughout. Patient verbalizes no further mobility concerns at this time, is functioning safely with mobility to D/C at this level of care. Updated nurse on results of session, patient left seated in bedside chair in direct handoff to OT, all lines intact, all needs met with call light in reach. Patient history and/or personal factors that may impact the plan of care include home accessibility concerns, co-morbidities (BPH, chronic neurogenic claudication 2/2 lumbar stenosis, cervical radiculopathy, cancer, migraines, HTN) affecting pain levels, medical status, prior surgeries (L TSA 2011), and longstanding history of pain. Based on the physical therapy examination of the noted systems and mobility skills, the patient presentation is stable given the patient demonstrates no significant barriers to meeting therapy goals and presents with symptoms that are unchanging/predictable.  Physical Therapy to sign off at this time, please re-consult if patient experiences a decline in functional status. The patient's Approx Degree of Impairment: 35.83% has been calculated based on documentation in the [facility] '6 clicks' Inpatient Basic Mobility Short Form. Research supports that patients with this level of impairment may benefit from home with no services, however recommend OP PT once medically cleared to optimize body mechanics, facilitate safe return to full activity level, and provide form of conservative pain management. DISCHARGE RECOMMENDATIONS  PT Discharge Recommendations: Home;Outpatient PT    PLAN    Patient has been evaluated and presents with no skilled Physical Therapy needs at this time. Patient will be discharged from Physical Therapy services. Please re-order if a new functional limitation presents during this admission. PHYSICAL THERAPY MEDICAL/SOCIAL HISTORY       Problem List  Principal Problem:    Spinal stenosis of lumbar region with neurogenic claudication  Active Problems:    Benign non-nodular prostatic hyperplasia with lower urinary tract symptoms    Hypercholesterolemia    RLS (restless legs syndrome)    S/P lumbar fusion    History of melanoma    History of migraine      HOME SITUATION  Home Situation  Type of Home: House  Home Layout: Multi-level  Stairs to Enter : 3  Railing: No  Stairs to Bedroom: 16  Railing: Yes  Lives With: Spouse; Family  Drives: Yes ()  Patient Owned Equipment: None  Patient Regularly Uses: None     Prior Level of [place]: independent no assistive device, active, [employer], 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 \"    PHYSICAL THERAPY EXAMINATION     OBJECTIVE  Precautions: Spine  Fall Risk: Standard fall risk    WEIGHT BEARING RESTRICTION  None    PAIN ASSESSMENT  Ratin  Location: incision  Management Techniques:  Body mechanics; Activity promotion    COGNITION  Overall Cognitive Status:  WFL - within functional limits    RANGE OF MOTION AND STRENGTH ASSESSMENT  Upper extremity ROM and strength are within functional limits  BUEs within spinal precautions   Lower extremity ROM is within functional limits  BLEs within spinal precautions   Lower extremity strength is within functional limits  BLEs within spinal precautions     BALANCE  Static Sitting: Normal  Dynamic Sitting: Normal  Static Standing: Normal  Dynamic Standing: Normal      NEUROLOGICAL FINDINGS     Coordination - Heel to Shin: Symmetrical     Sensation: WNL, improved since prior to surgery          ACTIVITY TOLERANCE  Pulse: 79                      O2 WALK  Oxygen Therapy  SPO2% on Room Air at Rest: 97    AM-PAC '6-Clicks' INPATIENT SHORT FORM - BASIC MOBILITY  How much difficulty does the patient currently have. .. Patient Difficulty: Turning over in bed (including adjusting bedclothes, sheets and blankets)?: None   Patient Difficulty: Sitting down on and standing up from a chair with arms (e.g., wheelchair, bedside commode, etc.): None   Patient Difficulty: Moving from lying on back to sitting on the side of the bed?: A Little   How much help from another person does the patient currently need. ..    Help from Another: Moving to and from a bed to a chair (including a wheelchair)?: A Little   Help from Another: Need to walk in hospital room?: A Little   Help from Another: Climbing 3-5 steps with a railing?: A Little     AM-PAC Score:  Raw Score: 20   Approx Degree of Impairment: 35.83%   Standardized Score (AM-PAC Scale): 47.67   CMS Modifier (G-Code): CJ    FUNCTIONAL ABILITY STATUS  Functional Mobility/Gait Assessment  Gait Assistance: Supervision  Distance (ft): 300'  Assistive Device: Rolling walker;None  Pattern: Within Functional Limits (step-through pattern, slow-moderate pace, erect posture,  able to perform last 50' no AD with reciprocal arm swing, no LOB)  Stairs: Stairs  How Many Stairs: 12  Device: 1 Rail  Assist: Supervision  Pattern: Ascend and Descend  Ascend and Descend : Step to      Exercise/Education Provided:  Bed mobility  Body mechanics  Energy conservation  Functional activity tolerated  Gait training  Posture  ROM  Strengthening  Transfer training    Patient End of Session: Up in chair;Needs met;Call light within reach;RN aware of session/findings; With [address] staff; All patient questions and concerns addressed    Patient was able to achieve the following . ..    Patient able to transfer  Safely and independently    Patient able to ambulate on level surfaces  Safely and independently     Patient Evaluation Complexity Level:  History Moderate - 1 or 2 personal factors and/or co-morbidities   Examination of body systems Low - addressing 1-2 elements   Clinical Presentation Low - Stable   Clinical Decision Making Low Complexity       Therapeutic Activity: 18 minutes    Jessee Cheadle, PT, DPT  [facility]  Inpatient Rehabilitation  Physical Therapy  [phone]

## 2023-09-28 NOTE — PROGRESS NOTES
Patient has been cleared by therapy. Pain is controlled. Therefore patient is medically stable to be discharged home today. Patient will follow-up with neurosurgery. Medications per medicine reconciliation form.

## 2023-09-29 ENCOUNTER — PATIENT OUTREACH (OUTPATIENT)
Dept: CASE MANAGEMENT | Age: 58
End: 2023-09-29

## 2023-09-30 ENCOUNTER — MOBILE ENCOUNTER (OUTPATIENT)
Dept: INTERNAL MEDICINE CLINIC | Facility: CLINIC | Age: 58
End: 2023-09-30

## 2023-09-30 RX ORDER — CIPROFLOXACIN 500 MG/1
500 TABLET, FILM COATED ORAL 2 TIMES DAILY
Qty: 14 TABLET | Refills: 0 | Status: SHIPPED | OUTPATIENT
Start: 2023-09-30

## 2023-10-03 NOTE — PROGRESS NOTES
Patient called on-call this weekend with complaints of UTI, we discussed at length, urine collection, treatment, Cipro started, instructions to follow-up with primary given to the patient, he verbalized understanding. His wife is on the phone, recently discharged with spinal surgery.

## 2023-10-06 ENCOUNTER — OFFICE VISIT (OUTPATIENT)
Dept: INTERNAL MEDICINE CLINIC | Facility: CLINIC | Age: 58
End: 2023-10-06

## 2023-10-06 ENCOUNTER — TELEPHONE (OUTPATIENT)
Dept: INTERNAL MEDICINE CLINIC | Facility: CLINIC | Age: 58
End: 2023-10-06

## 2023-10-06 ENCOUNTER — LAB ENCOUNTER (OUTPATIENT)
Dept: LAB | Age: 58
End: 2023-10-06
Attending: INTERNAL MEDICINE
Payer: COMMERCIAL

## 2023-10-06 VITALS
TEMPERATURE: 98 F | BODY MASS INDEX: 27.4 KG/M2 | OXYGEN SATURATION: 98 % | DIASTOLIC BLOOD PRESSURE: 78 MMHG | WEIGHT: 225 LBS | HEIGHT: 76 IN | SYSTOLIC BLOOD PRESSURE: 116 MMHG | HEART RATE: 89 BPM | RESPIRATION RATE: 16 BRPM

## 2023-10-06 DIAGNOSIS — Z98.1 S/P LUMBAR FUSION: ICD-10-CM

## 2023-10-06 DIAGNOSIS — R50.9 FEVER, UNSPECIFIED FEVER CAUSE: ICD-10-CM

## 2023-10-06 DIAGNOSIS — N40.0 BENIGN PROSTATIC HYPERPLASIA, UNSPECIFIED WHETHER LOWER URINARY TRACT SYMPTOMS PRESENT: ICD-10-CM

## 2023-10-06 DIAGNOSIS — R39.9 LOWER URINARY TRACT SYMPTOMS (LUTS): Primary | ICD-10-CM

## 2023-10-06 DIAGNOSIS — R39.9 LOWER URINARY TRACT SYMPTOMS (LUTS): ICD-10-CM

## 2023-10-06 LAB
ANION GAP SERPL CALC-SCNC: 10 MMOL/L (ref 0–18)
BASOPHILS # BLD AUTO: 0.07 X10(3) UL (ref 0–0.2)
BASOPHILS NFR BLD AUTO: 0.8 %
BILIRUB UR QL: NEGATIVE
BUN BLD-MCNC: 12 MG/DL (ref 7–18)
BUN/CREAT SERPL: 10.1 (ref 10–20)
CALCIUM BLD-MCNC: 9.7 MG/DL (ref 8.5–10.1)
CHLORIDE SERPL-SCNC: 104 MMOL/L (ref 98–112)
CLARITY UR: CLEAR
CO2 SERPL-SCNC: 27 MMOL/L (ref 21–32)
COLOR UR: YELLOW
CREAT BLD-MCNC: 1.19 MG/DL
DEPRECATED RDW RBC AUTO: 43 FL (ref 35.1–46.3)
EGFRCR SERPLBLD CKD-EPI 2021: 71 ML/MIN/1.73M2 (ref 60–?)
EOSINOPHIL # BLD AUTO: 0.06 X10(3) UL (ref 0–0.7)
EOSINOPHIL NFR BLD AUTO: 0.7 %
ERYTHROCYTE [DISTWIDTH] IN BLOOD BY AUTOMATED COUNT: 12.1 % (ref 11–15)
FASTING STATUS PATIENT QL REPORTED: YES
GLUCOSE BLD-MCNC: 112 MG/DL (ref 70–99)
GLUCOSE UR-MCNC: NORMAL MG/DL
HCT VFR BLD AUTO: 43 %
HGB BLD-MCNC: 14.7 G/DL
IMM GRANULOCYTES # BLD AUTO: 0.02 X10(3) UL (ref 0–1)
IMM GRANULOCYTES NFR BLD: 0.2 %
KETONES UR-MCNC: NEGATIVE MG/DL
LEUKOCYTE ESTERASE UR QL STRIP.AUTO: NEGATIVE
LYMPHOCYTES # BLD AUTO: 1.63 X10(3) UL (ref 1–4)
LYMPHOCYTES NFR BLD AUTO: 19.3 %
MCH RBC QN AUTO: 33 PG (ref 26–34)
MCHC RBC AUTO-ENTMCNC: 34.2 G/DL (ref 31–37)
MCV RBC AUTO: 96.6 FL
MONOCYTES # BLD AUTO: 0.58 X10(3) UL (ref 0.1–1)
MONOCYTES NFR BLD AUTO: 6.9 %
NEUTROPHILS # BLD AUTO: 6.09 X10 (3) UL (ref 1.5–7.7)
NEUTROPHILS # BLD AUTO: 6.09 X10(3) UL (ref 1.5–7.7)
NEUTROPHILS NFR BLD AUTO: 72.1 %
NITRITE UR QL STRIP.AUTO: NEGATIVE
OSMOLALITY SERPL CALC.SUM OF ELEC: 293 MOSM/KG (ref 275–295)
PH UR: 5.5 [PH] (ref 5–8)
PLATELET # BLD AUTO: 394 10(3)UL (ref 150–450)
POTASSIUM SERPL-SCNC: 4.1 MMOL/L (ref 3.5–5.1)
PROT UR-MCNC: 30 MG/DL
RBC # BLD AUTO: 4.45 X10(6)UL
RBC #/AREA URNS AUTO: >10 /HPF
SODIUM SERPL-SCNC: 141 MMOL/L (ref 136–145)
SP GR UR STRIP: 1.02 (ref 1–1.03)
UROBILINOGEN UR STRIP-ACNC: NORMAL
WBC # BLD AUTO: 8.5 X10(3) UL (ref 4–11)

## 2023-10-06 PROCEDURE — 87086 URINE CULTURE/COLONY COUNT: CPT

## 2023-10-06 PROCEDURE — 3008F BODY MASS INDEX DOCD: CPT | Performed by: INTERNAL MEDICINE

## 2023-10-06 PROCEDURE — 3078F DIAST BP <80 MM HG: CPT | Performed by: INTERNAL MEDICINE

## 2023-10-06 PROCEDURE — 36415 COLL VENOUS BLD VENIPUNCTURE: CPT

## 2023-10-06 PROCEDURE — 3074F SYST BP LT 130 MM HG: CPT | Performed by: INTERNAL MEDICINE

## 2023-10-06 PROCEDURE — 85025 COMPLETE CBC W/AUTO DIFF WBC: CPT

## 2023-10-06 PROCEDURE — 81001 URINALYSIS AUTO W/SCOPE: CPT

## 2023-10-06 PROCEDURE — 99214 OFFICE O/P EST MOD 30 MIN: CPT | Performed by: INTERNAL MEDICINE

## 2023-10-06 PROCEDURE — 87040 BLOOD CULTURE FOR BACTERIA: CPT

## 2023-10-06 PROCEDURE — 80048 BASIC METABOLIC PNL TOTAL CA: CPT

## 2023-10-06 NOTE — TELEPHONE ENCOUNTER
Spoke to patient  Last weekend Dr PROCTOR on call started him on cipro for possible UTI.   He is still having symptoms.   Patient recently had back surgery, was not able to leave house to give urine sample at that time    Has two days left of cipro   Having difficulty sleeping at night not due to pain from his surgery, but due to frequent urination  Estimates he was up 8 times last night to urinate. Wakes up in a sweat.  He is on silodosin for BPH but states this urinary frequency is unusual for him  He has taken his temp a couple times this week and has always been normal.    States he has been progressing well in terms of his surgery. Has been off pain meds for the last three days. Off muscle relaxants for the lat 24 hours.  States his incision looks fine. No redness or drainage.    Reports mild pain when urinating, does not feeling like burning.  No trouble starting stream  Does not notice the frequency as much during the day.  Denies blood in urine.   Urine has been clear, \"more clear than normal\" and when he urinates it is large amounts.    He is not cleared to drive yet but could have family member drive him to the lab if needed    To Dr Shane on call to please review and advise---

## 2023-10-06 NOTE — TELEPHONE ENCOUNTER
Please call patient  He spoke with oncall physician last week with concern of UTI infection after back surgery  Patient was given Cipro, patient still experiencing symptoms  Surgeon told patient he should work through his primary for this  Please call to discuss/advise  Tasked to nursing

## 2023-10-06 NOTE — TELEPHONE ENCOUNTER
I discussed with patient.  We reviewed symptoms.  I asked him to come today at 2:30 PM for an office visit    Please add to schedule

## 2023-10-07 ENCOUNTER — TELEPHONE (OUTPATIENT)
Dept: INTERNAL MEDICINE CLINIC | Facility: CLINIC | Age: 58
End: 2023-10-07

## 2023-10-07 NOTE — TELEPHONE ENCOUNTER
Discussed with Nini Maharaj. White count normal.  He does have microscopic hematuria. No white cells in the urine. Blood cultures and urine culture pending. He has continued with frequent urination over the night. However interestingly during the day he is back to normal.  He does have some night sweats. I discussed emergency room evaluation but he feels he is not able to do that with his back. I discussed getting a CT scan of his kidneys or ultrasound of his kidneys and bladder to look for urinary retention or other abnormalities but he wishes not to pursue that for now. He wishes to go another day and see how he feels. He has 1 more day of Cipro. He wonders about a refill. In the end per his wishes we agreed that I will call him tomorrow with additional information on his labs and then decide further steps. NO INJ TODAY.

## 2023-10-08 ENCOUNTER — TELEPHONE (OUTPATIENT)
Dept: INTERNAL MEDICINE CLINIC | Facility: CLINIC | Age: 58
End: 2023-10-08

## 2023-10-08 NOTE — TELEPHONE ENCOUNTER
Discussed with Ivette Amin. Blood cultures negative so far. Urine culture negative. He still has frequent urination with normal urine volumes over the night. Maybe about 7 times. Then during the day this settles down to every 3-4 hours. He indicates he walked about a mile yesterday and 2 separate occasions. The days are better than the nights. He does have a little bit of \"warm\" feeling when he passes urine but urine stream is normal.    He has no white count. Urine culture negative. He finished his week of Cipro yesterday. Urinalysis does have some microscopic hematuria and we talked about this yesterday. Again I offered any imaging to be done today but he feels he wishes to hold off today and discuss his current symptoms with Dr. Vini Louise tomorrow. I told him he can call me if there is any other changes today. He still has some night sweats and he \"Googled\" this and indicates that I will go he found its not unusual to have night sweats after surgery. I have asked him to take his temperature. So far no elevated temperature further once he has taken. I told him that I will relay the events over the weekend to Dr. Vini Louise Monday and a decision can be made and next steps. ( Ziggy Sierra.  Nikolas Avalos )

## 2023-10-20 ENCOUNTER — TELEPHONE (OUTPATIENT)
Dept: INTERNAL MEDICINE CLINIC | Facility: CLINIC | Age: 58
End: 2023-10-20

## 2023-10-20 DIAGNOSIS — G25.81 RLS (RESTLESS LEGS SYNDROME): Primary | ICD-10-CM

## 2023-10-20 NOTE — TELEPHONE ENCOUNTER
Patient is calling to speak with Dr Dino Ann directly. Patient states he would like to go over some post surgical information. No additional information was shared with .      Patient aware MD is out of the office until Monday, okay to wait     Ph # [phone]

## 2023-10-24 NOTE — TELEPHONE ENCOUNTER
Rec schedule appt to discuss options for Rx  I also ordered iron studies (low iron can exacerbate restless legs symptoms)

## 2023-10-24 NOTE — TELEPHONE ENCOUNTER
Spoke with patient states he had spinal fusion surgery 09/27/23 with Dr. Hazel Osgood. Patient states his restless legs have come back. States it seemed to have resolved for a few days after surgery but has returned. States it feels more intense now than before surgery. He is having difficulty sleeping at night. Symptoms are worse when sitting up or lying down. Standing and walking around helps decrease sensation. Had 2 week post surgical visit with Dr. Hazel Osgood who didn't seem too concerned with his symptoms. States he started PT about a week ago  Wondering if symptoms are worsening because he has been walking more. Has tried Tylenol and Norco, with no help. [address] [facility] just puts him to sleep. Advised patient to reach out to surgeon as well to inform him of his symptoms.     To Dr. Cristal Pruett please advise in Dr. Alejo Reid absence    [address] [facility]

## 2023-10-25 NOTE — TELEPHONE ENCOUNTER
Spoke to patient and relayed MD message. States reached out to Dr Garret Thakur and was prescribed Gabapentin  200 mg BID and will start meds tonight. Pt also informed of the Iron level study that were ordered. Pt verbalized understanding and agrees with plan.

## 2023-10-30 ENCOUNTER — LAB ENCOUNTER (OUTPATIENT)
Dept: LAB | Age: 58
End: 2023-10-30
Attending: INTERNAL MEDICINE
Payer: COMMERCIAL

## 2023-10-30 DIAGNOSIS — G25.81 RLS (RESTLESS LEGS SYNDROME): ICD-10-CM

## 2023-10-30 LAB
BASOPHILS # BLD AUTO: 0.08 X10(3) UL (ref 0–0.2)
BASOPHILS NFR BLD AUTO: 1 %
DEPRECATED HBV CORE AB SER IA-ACNC: 144.9 NG/ML
DEPRECATED RDW RBC AUTO: 43.2 FL (ref 35.1–46.3)
EOSINOPHIL # BLD AUTO: 0.1 X10(3) UL (ref 0–0.7)
EOSINOPHIL NFR BLD AUTO: 1.3 %
ERYTHROCYTE [DISTWIDTH] IN BLOOD BY AUTOMATED COUNT: 12.4 % (ref 11–15)
HCT VFR BLD AUTO: 42 %
HGB BLD-MCNC: 14.2 G/DL
IMM GRANULOCYTES # BLD AUTO: 0.02 X10(3) UL (ref 0–1)
IMM GRANULOCYTES NFR BLD: 0.3 %
IRON SATN MFR SERPL: 20 %
IRON SERPL-MCNC: 72 UG/DL
LYMPHOCYTES # BLD AUTO: 2.3 X10(3) UL (ref 1–4)
LYMPHOCYTES NFR BLD AUTO: 29.9 %
MCH RBC QN AUTO: 31.8 PG (ref 26–34)
MCHC RBC AUTO-ENTMCNC: 33.8 G/DL (ref 31–37)
MCV RBC AUTO: 94.2 FL
MONOCYTES # BLD AUTO: 0.71 X10(3) UL (ref 0.1–1)
MONOCYTES NFR BLD AUTO: 9.2 %
NEUTROPHILS # BLD AUTO: 4.49 X10 (3) UL (ref 1.5–7.7)
NEUTROPHILS # BLD AUTO: 4.49 X10(3) UL (ref 1.5–7.7)
NEUTROPHILS NFR BLD AUTO: 58.3 %
PLATELET # BLD AUTO: 255 10(3)UL (ref 150–450)
RBC # BLD AUTO: 4.46 X10(6)UL
TIBC SERPL-MCNC: 361 UG/DL (ref 240–450)
TRANSFERRIN SERPL-MCNC: 242 MG/DL (ref 200–360)
WBC # BLD AUTO: 7.7 X10(3) UL (ref 4–11)

## 2023-10-30 PROCEDURE — 84466 ASSAY OF TRANSFERRIN: CPT

## 2023-10-30 PROCEDURE — 82728 ASSAY OF FERRITIN: CPT

## 2023-10-30 PROCEDURE — 36415 COLL VENOUS BLD VENIPUNCTURE: CPT

## 2023-10-30 PROCEDURE — 83540 ASSAY OF IRON: CPT

## 2023-10-30 PROCEDURE — 85025 COMPLETE CBC W/AUTO DIFF WBC: CPT

## 2023-11-20 ENCOUNTER — TELEPHONE (OUTPATIENT)
Dept: INTERNAL MEDICINE CLINIC | Facility: CLINIC | Age: 58
End: 2023-11-20

## 2023-11-21 NOTE — TELEPHONE ENCOUNTER
Advise referral to Dr Froilan Young, Dr Delicia Soto, or Dr Edward Villarreal    [address] [place] [address], [address] [employer] [phone]    Please call pt

## 2023-11-30 RX ORDER — CELECOXIB 200 MG/1
200 CAPSULE ORAL DAILY
Qty: 90 CAPSULE | Refills: 3 | OUTPATIENT
Start: 2023-11-30

## 2023-11-30 NOTE — TELEPHONE ENCOUNTER
Refill request has failed the Ambulatory Medication Refill Standing Order and is routed to the primary physician to review the following:    Requested Prescriptions     Refused Prescriptions Disp Refills    CELECOXIB 200 MG Oral Cap [Pharmacy Med Name: CELECOXIB 200 MG CAPSULE] 90 capsule 3     Sig: TAKE 1 CAPSULE BY MOUTH EVERY DAY     Refused By: Joana Ling     Reason for Refusal: Refill not appropriate

## 2023-12-18 ENCOUNTER — HOSPITAL ENCOUNTER (OUTPATIENT)
Dept: GENERAL RADIOLOGY | Facility: HOSPITAL | Age: 58
Discharge: HOME | End: 2023-12-18
Payer: COMMERCIAL

## 2023-12-18 DIAGNOSIS — Z98.1 S/P LUMBAR SPINAL FUSION: ICD-10-CM

## 2023-12-18 PROCEDURE — 72100 X-RAY EXAM L-S SPINE 2/3 VWS: CPT

## 2024-01-09 ENCOUNTER — TELEPHONE (OUTPATIENT)
Dept: INTERNAL MEDICINE CLINIC | Facility: CLINIC | Age: 59
End: 2024-01-09

## 2024-01-09 NOTE — TELEPHONE ENCOUNTER
Edisont to pt.     Medication Detail    Medication Quantity Refills Start End   CELECOXIB 200 MG Oral Cap (Discontinued) 90 capsule 3 3/17/2022 5/4/2022   Sig:   TAKE ONE CAPSULE BY MOUTH EVERY DAY     Patient not taking:   Reported on 5/4/2022     Route:   (none)     Reason for Discontinue:   Physician directed     Order #:   [id number]     This Order Has Been Discontinued    Order Status Reason By On   Discontinued Physician directed Brandon Lopez MD 5/4/22 9713

## 2024-01-10 RX ORDER — CELECOXIB 200 MG/1
200 CAPSULE ORAL DAILY
Qty: 90 CAPSULE | Refills: 3 | Status: SHIPPED | OUTPATIENT
Start: 2024-01-10

## 2024-01-10 NOTE — TELEPHONE ENCOUNTER
Imp- lumbar spinal stenosis    Creatinine 1.19 in Oct 2023    Rec- ERx sent for Celebrex 200 mg po every day, #90, 3 RF    Please call pt

## 2024-01-11 ENCOUNTER — LAB ENCOUNTER (OUTPATIENT)
Dept: LAB | Facility: HOSPITAL | Age: 59
End: 2024-01-11
Attending: UROLOGY
Payer: COMMERCIAL

## 2024-01-11 ENCOUNTER — OFFICE VISIT (OUTPATIENT)
Dept: SURGERY | Facility: CLINIC | Age: 59
End: 2024-01-11
Payer: COMMERCIAL

## 2024-01-11 DIAGNOSIS — N40.1 BPH WITH OBSTRUCTION/LOWER URINARY TRACT SYMPTOMS: Primary | ICD-10-CM

## 2024-01-11 DIAGNOSIS — N40.1 BPH WITH OBSTRUCTION/LOWER URINARY TRACT SYMPTOMS: ICD-10-CM

## 2024-01-11 DIAGNOSIS — N13.8 BPH WITH OBSTRUCTION/LOWER URINARY TRACT SYMPTOMS: ICD-10-CM

## 2024-01-11 DIAGNOSIS — N13.8 BPH WITH OBSTRUCTION/LOWER URINARY TRACT SYMPTOMS: Primary | ICD-10-CM

## 2024-01-11 LAB
BILIRUB UR QL: NEGATIVE
CLARITY UR: CLEAR
COLOR UR: YELLOW
GLUCOSE UR-MCNC: NORMAL MG/DL
KETONES UR-MCNC: NEGATIVE MG/DL
LEUKOCYTE ESTERASE UR QL STRIP.AUTO: NEGATIVE
NITRITE UR QL STRIP.AUTO: NEGATIVE
PH UR: 6.5 [PH] (ref 5–8)
PROT UR-MCNC: 20 MG/DL
PSA SERPL-MCNC: 0.61 NG/ML (ref ?–4)
RBC #/AREA URNS AUTO: >10 /HPF
SP GR UR STRIP: 1.03 (ref 1–1.03)
UROBILINOGEN UR STRIP-ACNC: NORMAL

## 2024-01-11 PROCEDURE — 36415 COLL VENOUS BLD VENIPUNCTURE: CPT

## 2024-01-11 PROCEDURE — 99204 OFFICE O/P NEW MOD 45 MIN: CPT | Performed by: UROLOGY

## 2024-01-11 PROCEDURE — 81001 URINALYSIS AUTO W/SCOPE: CPT

## 2024-01-11 PROCEDURE — 84153 ASSAY OF PSA TOTAL: CPT

## 2024-01-11 RX ORDER — GABAPENTIN 300 MG/1
CAPSULE ORAL
COMMUNITY

## 2024-01-19 ENCOUNTER — TELEPHONE (OUTPATIENT)
Dept: SURGERY | Facility: CLINIC | Age: 59
End: 2024-01-19

## 2024-01-19 NOTE — TELEPHONE ENCOUNTER
Called patient, verified name and . I let pt know he needs to schedule a CT scan preferably before the cystoscopy on . Pt aware urine showed microscopic blood. Pt verbalized understandings and has no further questions.   Encounter complete.

## 2024-01-19 NOTE — TELEPHONE ENCOUNTER
----- Message from Parveen Liang MD sent at 1/17/2024  6:44 PM CST -----   staff,  Please call this patient.  His urine analysis from his last visit shows abnormal levels of microscopic blood in the urine.    He is already scheduled for a cystoscopy as per my last note.  Explain that he will also need a CT scan s part of his microhematuria evaluation.  I placed an order.  He should obtain prior to the cystoscopy. Thanks

## 2024-01-29 ENCOUNTER — HOSPITAL ENCOUNTER (OUTPATIENT)
Dept: CT IMAGING | Facility: HOSPITAL | Age: 59
Discharge: HOME | End: 2024-01-29
Attending: UROLOGY
Payer: COMMERCIAL

## 2024-01-29 DIAGNOSIS — R31.29 MICROHEMATURIA: ICD-10-CM

## 2024-01-29 LAB
CREAT BLD-MCNC: 1.2 MG/DL
EGFRCR SERPLBLD CKD-EPI 2021: 71 ML/MIN/1.73M2 (ref 60–?)

## 2024-01-29 PROCEDURE — 82565 ASSAY OF CREATININE: CPT

## 2024-01-29 PROCEDURE — 74178 CT ABD&PLV WO CNTR FLWD CNTR: CPT | Performed by: UROLOGY

## 2024-01-29 PROCEDURE — 76377 3D RENDER W/INTRP POSTPROCES: CPT | Performed by: UROLOGY

## 2024-01-30 ENCOUNTER — PROCEDURE (OUTPATIENT)
Dept: SURGERY | Facility: CLINIC | Age: 59
End: 2024-01-30
Payer: COMMERCIAL

## 2024-01-30 VITALS — HEART RATE: 59 BPM | SYSTOLIC BLOOD PRESSURE: 130 MMHG | DIASTOLIC BLOOD PRESSURE: 87 MMHG

## 2024-01-30 DIAGNOSIS — N13.8 BPH WITH OBSTRUCTION/LOWER URINARY TRACT SYMPTOMS: Primary | ICD-10-CM

## 2024-01-30 DIAGNOSIS — R31.29 MICROHEMATURIA: ICD-10-CM

## 2024-01-30 DIAGNOSIS — N40.1 BPH WITH OBSTRUCTION/LOWER URINARY TRACT SYMPTOMS: Primary | ICD-10-CM

## 2024-01-30 PROCEDURE — 51798 US URINE CAPACITY MEASURE: CPT | Performed by: UROLOGY

## 2024-01-30 PROCEDURE — 52000 CYSTOURETHROSCOPY: CPT | Performed by: UROLOGY

## 2024-01-30 PROCEDURE — 3075F SYST BP GE 130 - 139MM HG: CPT | Performed by: UROLOGY

## 2024-01-30 PROCEDURE — 3079F DIAST BP 80-89 MM HG: CPT | Performed by: UROLOGY

## 2024-01-30 NOTE — PROGRESS NOTES
Adonay Hamm is a 58 year old male.    HPI:     Chief Complaint   Patient presents with    Procedure     Uroflow, cysto, trus       58-year-old male in follow-up to a previous visit January 11, 2024 for BPH, lower urinary tract symptoms poorly controlled with silodosin 8 mg daily.  Noted on urinalysis to have microhematuria.  Referred for a CT urogram performed January 29, 2024 demonstrating punctate nonobstructing stones bilaterally, small umbilical hernia and left inguinal hernia otherwise incidental nonworrisome findings.      HISTORY:  Past Medical History:   Diagnosis Date    Back problem     Cancer (HCC)     Cervical radiculopathy     Hypercholesterolemia     Lipid screening 05/16/2014    Lumbar disc disease     Melanoma in situ of back (HCC) 08/2016    Stage 0    Migraines     Unspecified essential hypertension       Past Surgical History:   Procedure Laterality Date    COLONOSCOPY  11/2016    CORTISONE INJECTION      both knee and right elbow. done at [facility]    LASIK      SHOULDER ARTHROSCOPY Left 2011    rotator cuff surgery - Ortho Dr Alegre       Family History   Problem Relation Age of Onset    Cancer Mother 76        colon cancer    Alcohol and Other Disorders Associated Father     Cancer Father 75        Non-Hodgkins lymphoma      Social History:   Social History     Socioeconomic History    Marital status:    Tobacco Use    Smoking status: Never     Passive exposure: Never    Smokeless tobacco: Never   Vaping Use    Vaping Use: Never used   Substance and Sexual Activity    Alcohol use: Yes     Comment: occasionally. 3-4 drinks a month    Drug use: No   Other Topics Concern    Caffeine Concern Yes     Comment: Coffee 1-2 cups daily        Medications (Active prior to today's visit):  Current Outpatient Medications   Medication Sig Dispense Refill    gabapentin 300 MG Oral Cap TAKE 1 CAP BY MOUTH THREE TIMES PER DAY AND 1 CAP EVERY NIGHT AT BEDTIME. DO ALL THIS FOR 90 DAYS.       celecoxib 200 MG Oral Cap Take 1 capsule (200 mg total) by mouth daily. 90 capsule 3    ciprofloxacin 500 MG Oral Tab Take 1 tablet (500 mg total) by mouth 2 (two) times daily. 14 tablet 0    SILODOSIN 8 MG Oral Cap TAKE 1 CAPSULE BY MOUTH ONCE DAILY 90 capsule 3       Allergies:  No Known Allergies      ROS:       PHYSICAL EXAM:   Adonay Hamm  : 8/3/1965  Referring Physician: No ref. provider found     Chief Complaint   Patient presents with    Procedure     Uroflow, cysto, trus           CYSTOSCOPY    Anesthesia:  2% lidocaine gel    Urethra: Normal  Prostate / Pelvic: Normal  Bladder: Normal.  No tumor, stone, diverticulum, or glomerulation  U.O's: Normal  Trabeculation: grade 2    Bilobar prostate enlargement, 3.5 cm prostatic urethral length      POST CYSTOSCOPY MEDICATIONS: sample one tablet Cipro 500mg given to patient    DIAGNOSIS:     PLAN: See below    Bladder scan for postvoid residual volume 49 mL      Prostate measurements can be obtained from his CT scan and were deferred by transrectal ultrasound       ASSESSMENT/PLAN:   Assessment   Encounter Diagnoses   Name Primary?    BPH with obstruction/lower urinary tract symptoms Yes    Microhematuria        Reviewed findings at length with patient.  Discussed options for management including greenlight laser vaporization and UroLift.  Pros and cons of both of those options risks and possible side effects were reviewed with the patient and he understands and agrees.  He will consider those options.  Pamphlets were provided.  He will call us back once he is ready to schedule.  In the meantime, he will continue on silodosin 8 mg daily.         Orders This Visit:  Orders Placed This Encounter   Procedures    Cytology, fluids       Meds This Visit:  Requested Prescriptions      No prescriptions requested or ordered in this encounter       Imaging & Referrals:  None     2024  Parveen Liang MD

## 2024-01-31 LAB — NON GYNE INTERPRETATION: NEGATIVE

## 2024-02-05 ENCOUNTER — TELEPHONE (OUTPATIENT)
Dept: INTERNAL MEDICINE CLINIC | Facility: CLINIC | Age: 59
End: 2024-02-05

## 2024-02-05 NOTE — TELEPHONE ENCOUNTER
On gabapentin 300 mg po BID and 600 mg po at bedtime;     Had also tried taking Tylenol PM for insomnia, then had over-sedation    Stopped Tylenol PM    Has been awakening at 3AM due to \"gasping\" during sleep; had been sleeping in semi-recliner;     Rec- advise weaning gabapentin 300 mg po TID x7d, 300 mg po BID x7d, then 300 mg po every day x 7d  then stop    Pt called

## 2024-02-05 NOTE — TELEPHONE ENCOUNTER
To  - called patient who has sleeping issues - he wakes up at 3 am  and thinks it is because he might have sleep apnea , cannot take a deep breath , he is sitting up then in a chair . He is on gabapentin for a couple months and read that there could be a respiratory side effect. He is exercising since his back is better. He is leaving town on Friday and wants to talk to

## 2024-02-05 NOTE — TELEPHONE ENCOUNTER
Patient called  Hoping to speak to Dr Trevino today  He is going out of town today and not returning until Friday  Hoping to hear back today  Tasked to nursing

## 2024-03-15 ENCOUNTER — HOSPITAL ENCOUNTER (OUTPATIENT)
Dept: GENERAL RADIOLOGY | Facility: HOSPITAL | Age: 59
Discharge: HOME | End: 2024-03-15
Attending: NEUROLOGICAL SURGERY
Payer: COMMERCIAL

## 2024-03-15 DIAGNOSIS — Z98.1 S/P LUMBAR SPINAL FUSION: ICD-10-CM

## 2024-03-15 PROCEDURE — 72100 X-RAY EXAM L-S SPINE 2/3 VWS: CPT | Performed by: NEUROLOGICAL SURGERY

## 2024-03-19 ENCOUNTER — OFFICE VISIT (OUTPATIENT)
Dept: SURGERY | Facility: CLINIC | Age: 59
End: 2024-03-19
Payer: COMMERCIAL

## 2024-03-19 VITALS
WEIGHT: 229 LBS | BODY MASS INDEX: 27.89 KG/M2 | HEART RATE: 64 BPM | RESPIRATION RATE: 18 BRPM | HEIGHT: 76 IN | OXYGEN SATURATION: 99 %

## 2024-03-19 DIAGNOSIS — Z98.1 S/P LUMBAR FUSION: Primary | ICD-10-CM

## 2024-03-19 PROCEDURE — 99204 OFFICE O/P NEW MOD 45 MIN: CPT | Performed by: NEUROLOGICAL SURGERY

## 2024-03-19 NOTE — PROGRESS NOTES
ENRICO PASCUAL M.D., F.A.A.N.S.     of Neurosurgery  [facility]  Board Certified Neurosurgeon                          [facility], [facility], [facility]        [place] [address]  [address]  [place] [address]    PHONE  [phone]          FAX  [phone]    [email or web address]      OFFICE FOLLOW-UP NOTE      Adonay Hamm    : 8/3/1965    MRN: [id number]  CSN: [id number]      PCP: Brandon Lopez MD  Referring Provider: No ref. provider found    Insurance: Payor: AET[employer] INS / Plan: AETNA POS / Product Type: POS /     Date of Visit: 3/19/2024    Reason for Visit:   Chief Complaint    Follow - Up                         History of Present Care:  Adonay Hamm is a a(n) 58 year old, male.  Patient is 6-month status post lumbar decompression fusion.  He is doing very well with essentially complete resolution of his lower back pain and lower extremity issues.  He is very happy with the outcome.  He is here with his 6-month postop x-ray.      History:  .  Past Medical History:   Diagnosis Date    Back problem     Cancer (HCC)     Cervical radiculopathy     Hypercholesterolemia     Lipid screening 2014    Lumbar disc disease     Melanoma in situ of back (HCC) 2016    Stage 0    Migraines     Unspecified essential hypertension       Past Surgical History:   Procedure Laterality Date    COLONOSCOPY  2016    CORTISONE INJECTION      both knee and right elbow. done at [facility]    LASIK      SHOULDER ARTHROSCOPY Left     rotator cuff surgery - Ortho Dr Alegre       Family History   Problem Relation Age of Onset    Cancer Mother 76        colon cancer    Alcohol and Other Disorders Associated Father     Cancer Father 75        Non-Hodgkins lymphoma      Social History     Socioeconomic History    Marital status:       Spouse name: Not on file    Number of children: Not on file    Years of education: Not on file    Highest education level: Not on file   Occupational History    Not on file   Tobacco Use    Smoking status: Never     Passive exposure: Never    Smokeless tobacco: Never   Vaping Use    Vaping Use: Never used   Substance and Sexual Activity    Alcohol use: Yes     Comment: Very infrequent    Drug use: No    Sexual activity: Not on file   Other Topics Concern     Service Not Asked    Blood Transfusions Not Asked    Caffeine Concern No    Occupational Exposure Not Asked    Hobby Hazards Not Asked    Sleep Concern Not Asked    Stress Concern Not Asked    Weight Concern Not Asked    Special Diet Not Asked    Back Care Not Asked    Exercise Not Asked    Bike Helmet Not Asked    Seat Belt Not Asked    Self-Exams Not Asked   Social History Narrative    Not on file     Social Determinants of Health     Financial Resource Strain: Not on file   Food Insecurity: Not on file   Transportation Needs: Not on file   Physical Activity: Not on file   Stress: Not on file   Social Connections: Not on file   Housing Stability: Not on file        Allergies:  No Known Allergies      Medications:  Current Outpatient Medications   Medication Sig Dispense Refill    diazePAM 5 MG Oral Tab Take 2 tablets (10 mg total) by mouth nightly as needed for Anxiety.      celecoxib 200 MG Oral Cap Take 1 capsule (200 mg total) by mouth daily. 90 capsule 3    SILODOSIN 8 MG Oral Cap TAKE 1 CAPSULE BY MOUTH ONCE DAILY 90 capsule 3        Review of Systems:  A 10-point system was reviewed.  Pertinent positives and negatives are noted in HPI.      Physical Exam:  Pulse 64   Resp 18   Ht 76\"   Wt 229 lb (103.9 kg)   SpO2 99%   BMI 27.87 kg/m²         Neurological Exam:    AAOx3, following commands  PERRLA  EOMI  Face symmetrical  Tongue midline  Hearing symmetrical and intact to finger rub    No rhinorrhea or otorrhea    Romberg negative    Motor  Strength:  Intact in the lower extremities    Sensation to light touch:  Intact in the lower extremities    Incision:  Clean dry and intact    Abdomen:  Soft, non-distended, non-tender, with no rebound or guarding.  No peritoneal signs.     Extremities:  Non-tender, no lower extremity edema noted.      Labs:  CBC:  Lab Results   Component Value Date    WBC 7.7 10/30/2023    HGB 14.2 10/30/2023    HCT 42.0 10/30/2023    MCV 94.2 10/30/2023    .0 10/30/2023      BMG:  Lab Results   Component Value Date     10/06/2023    K 4.1 10/06/2023    CO2 27.0 10/06/2023     10/06/2023    BUN 12 10/06/2023      INR:  Lab Results   Component Value Date    INR 0.97 08/30/2023          Diagnostics:  I reviewed an x-ray of the lumbar spine with evidence of L4-5 interbody fusion.  The hardware is properly positioned and there is no evidence of complications.    Diagnosis:  1. S/P lumbar fusion      Assessment/Plan:  R patient is doing very well with resolution of his preoperative symptoms.  The x-rays satisfactory.  He is released from all restrictions and is encouraged to continue his activity level.  We are very encouraged with his progress.  He will follow-up at the 1 year postop cristino with another x-ray of the lumbar spine.    More than 30 minutes were spent during this visit and the coordination of this patient's care. All questions and concerns were addressed. We appreciate the opportunity to participate in the care of this patient. Please do not hesitate to call our office ([phone]) with any issues.        Ridge Orta M.D., F.A.A.N.S.    3/19/2024  9:26 AM    This note has been dictated utilizing voice recognition software. Unfortunately, this may lead to occasional typographical errors. If there are any questions regarding this, please do not hesitate to contact our office.

## 2024-04-03 RX ORDER — SILODOSIN 8 MG/1
CAPSULE ORAL
Qty: 90 CAPSULE | Refills: 3 | Status: SHIPPED | OUTPATIENT
Start: 2024-04-03

## 2024-04-03 NOTE — TELEPHONE ENCOUNTER
Refill request is for a maintenance medication and has met the criteria specified in the Ambulatory Medication Refill Standing Order for eligibility, visits, laboratory, alerts and was sent to the requested pharmacy.    Requested Prescriptions     Signed Prescriptions Disp Refills    SILODOSIN 8 MG Oral Cap 90 capsule 3     Sig: TAKE 1 CAPSULE BY MOUTH ONCE DAILY     Authorizing Provider: SALEEM URIBE     Ordering User: SHAE MCCLELLAND

## 2024-04-22 ENCOUNTER — APPOINTMENT (OUTPATIENT)
Dept: URBAN - METROPOLITAN AREA CLINIC 244 | Age: 59
Setting detail: DERMATOLOGY
End: 2024-04-22

## 2024-04-22 DIAGNOSIS — Z86.006 PERSONAL HISTORY OF MELANOMA IN-SITU: ICD-10-CM

## 2024-04-22 DIAGNOSIS — Z87.2 PERSONAL HISTORY OF DISEASES OF THE SKIN AND SUBCUTANEOUS TISSUE: ICD-10-CM

## 2024-04-22 DIAGNOSIS — D22 MELANOCYTIC NEVI: ICD-10-CM

## 2024-04-22 DIAGNOSIS — L82.1 OTHER SEBORRHEIC KERATOSIS: ICD-10-CM

## 2024-04-22 DIAGNOSIS — Z85.828 PERSONAL HISTORY OF OTHER MALIGNANT NEOPLASM OF SKIN: ICD-10-CM

## 2024-04-22 DIAGNOSIS — L81.4 OTHER MELANIN HYPERPIGMENTATION: ICD-10-CM

## 2024-04-22 PROBLEM — D22.5 MELANOCYTIC NEVI OF TRUNK: Status: ACTIVE | Noted: 2024-04-22

## 2024-04-22 PROCEDURE — OTHER MIPS QUALITY: OTHER

## 2024-04-22 PROCEDURE — OTHER COUNSELING: OTHER

## 2024-04-22 PROCEDURE — 99213 OFFICE O/P EST LOW 20 MIN: CPT

## 2024-04-22 ASSESSMENT — LOCATION DETAILED DESCRIPTION DERM
LOCATION DETAILED: RIGHT PROXIMAL LATERAL POSTERIOR UPPER ARM
LOCATION DETAILED: LEFT MEDIAL UPPER BACK
LOCATION DETAILED: LEFT SUPERIOR UPPER BACK
LOCATION DETAILED: UPPER STERNUM
LOCATION DETAILED: RIGHT BUTTOCK
LOCATION DETAILED: RIGHT INFERIOR UPPER BACK
LOCATION DETAILED: RIGHT INFERIOR LATERAL MIDBACK
LOCATION DETAILED: LEFT INFERIOR MEDIAL MIDBACK
LOCATION DETAILED: LEFT SUPERIOR MEDIAL UPPER BACK
LOCATION DETAILED: SUPERIOR LUMBAR SPINE
LOCATION DETAILED: RIGHT INFERIOR MEDIAL MIDBACK
LOCATION DETAILED: RIGHT SUPERIOR UPPER BACK
LOCATION DETAILED: RIGHT SUPERIOR MEDIAL UPPER BACK
LOCATION DETAILED: PERIUMBILICAL SKIN
LOCATION DETAILED: RIGHT SUPERIOR LATERAL LOWER BACK
LOCATION DETAILED: LEFT INFERIOR MEDIAL UPPER BACK
LOCATION DETAILED: RIGHT INFERIOR MEDIAL UPPER BACK

## 2024-04-22 ASSESSMENT — LOCATION SIMPLE DESCRIPTION DERM
LOCATION SIMPLE: RIGHT LOWER BACK
LOCATION SIMPLE: RIGHT BUTTOCK
LOCATION SIMPLE: LEFT UPPER BACK
LOCATION SIMPLE: LEFT LOWER BACK
LOCATION SIMPLE: ABDOMEN
LOCATION SIMPLE: LOWER BACK
LOCATION SIMPLE: RIGHT UPPER BACK
LOCATION SIMPLE: CHEST
LOCATION SIMPLE: RIGHT UPPER ARM

## 2024-04-22 ASSESSMENT — LOCATION ZONE DERM
LOCATION ZONE: ARM
LOCATION ZONE: TRUNK

## 2024-04-22 NOTE — PROCEDURE: MIPS QUALITY
Quality 137: Melanoma: Continuity Of Care - Recall System: Patient information entered into a recall system that includes: target date for the next exam specified AND a process to follow up with patients regarding missed or unscheduled appointments
Quality 431: Preventive Care And Screening: Unhealthy Alcohol Use - Screening: Patient not identified as an unhealthy alcohol user when screened for unhealthy alcohol use using a systematic screening method
Quality 397: Melanoma: Reporting: Documentation of medical reason(s) for not including pT Category,thickness, ulceration and mitotic rate, peripheral and deep margin status and presence or absence of microsatellitosis for invasive tumors (e.g., negative skin biopsies, insufficient tissue, or other documented medical reasons).
Detail Level: Detailed
Quality 226: Preventive Care And Screening: Tobacco Use: Screening And Cessation Intervention: Patient screened for tobacco use and is an ex/non-smoker

## 2024-07-31 NOTE — TELEPHONE ENCOUNTER
Called patient and relayed  message - verbalized understanding   [de-identified] : Fever DISPLAY PLAN FREE TEXT [[email or web address]] : Skyler is a 3 yo M who presents for sick visit. Has not yet had 3 yo shots or physical. Initially came in for scheduled physical but was switched to sick visit. He developed fever that started high 103.4tmax on the evening of 7/29 and had fever for 2 days. So far today, he has remained afebrile. This morning he was 99.8F.  However, starting late yesterday mother noticed a rash that developed, characterized by discrete red spots. Not itchy. Mostly located to upper back, posterior neck and behind ears. The rash came 1 day after the fever.  No conjunctivitis and no cough. No congestion. Although he has a slight runny nose.  No vomiting or diarrhea. He is staying hydrated and drinking fluids.  Mother administering 120mg tylenol suppositories prn for fever, and topping him off with oral to get to 160mg dose.        DISPLAY PLAN FREE TEXT DISPLAY PLAN FREE TEXT DISPLAY PLAN FREE TEXT DISPLAY PLAN FREE TEXT DISPLAY PLAN FREE TEXT DISPLAY PLAN FREE TEXT DISPLAY PLAN FREE TEXT DISPLAY PLAN FREE TEXT

## 2024-09-05 ENCOUNTER — TELEPHONE (OUTPATIENT)
Dept: INTERNAL MEDICINE CLINIC | Facility: CLINIC | Age: 59
End: 2024-09-05

## 2024-09-05 NOTE — TELEPHONE ENCOUNTER
Patient called, left voicemail message  Would like to discuss increasing dosage of Diazepam during the day with Dr Trevino  Tasked to nursing

## 2024-09-05 NOTE — TELEPHONE ENCOUNTER
Imp- anxiety    Rec- start sertraline 25 mg po every day x7d, then 50 g po every day thereafter    Pt called

## 2024-09-11 ENCOUNTER — HOSPITAL ENCOUNTER (OUTPATIENT)
Dept: GENERAL RADIOLOGY | Facility: HOSPITAL | Age: 59
Discharge: HOME | End: 2024-09-11
Attending: NEUROLOGICAL SURGERY
Payer: COMMERCIAL

## 2024-09-11 DIAGNOSIS — Z98.1 S/P LUMBAR FUSION: ICD-10-CM

## 2024-09-11 PROCEDURE — 72110 X-RAY EXAM L-2 SPINE 4/>VWS: CPT | Performed by: NEUROLOGICAL SURGERY

## 2024-09-24 ENCOUNTER — OFFICE VISIT (OUTPATIENT)
Dept: SURGERY | Facility: CLINIC | Age: 59
End: 2024-09-24
Payer: COMMERCIAL

## 2024-09-24 VITALS
HEART RATE: 63 BPM | BODY MASS INDEX: 28.01 KG/M2 | HEIGHT: 76 IN | SYSTOLIC BLOOD PRESSURE: 128 MMHG | WEIGHT: 230 LBS | DIASTOLIC BLOOD PRESSURE: 86 MMHG

## 2024-09-24 DIAGNOSIS — Z98.1 S/P LUMBAR FUSION: Primary | ICD-10-CM

## 2024-09-24 PROCEDURE — 99214 OFFICE O/P EST MOD 30 MIN: CPT | Performed by: NEUROLOGICAL SURGERY

## 2024-09-24 RX ORDER — HYDROXYZINE HYDROCHLORIDE 25 MG/1
25 TABLET, FILM COATED ORAL NIGHTLY
COMMUNITY
Start: 2024-02-07

## 2024-09-24 NOTE — PROGRESS NOTES
ENRICO PASCUAL M.D., F.A.A.N.S.     of Neurosurgery  [facility]  Board Certified Neurosurgeon                          [facility], [facility], [facility]        [address]  [address] [facility]  [place] [address]    PHONE  [phone]          FAX  [phone]    [email or web address]      OFFICE FOLLOW-UP NOTE      Adonay Hamm    : 8/3/1965    MRN: [id number]  CSN: [id number]      PCP: Brandon Lopez MD  Referring Provider: No ref. provider found    Insurance: Payor: AETNA INS / Plan: AETNA POS / Product Type: POS /     Date of Visit: 2024    Reason for Visit:   Chief Complaint    Follow - Up                         History of Present Care:  Adonay Hamm is a a(n) 59 year old, male.  Our patient is a year status post L4-5 TLIF.  He is doing very well.  He is here with his 1 year postop x-ray.  Intact      History:  .  Past Medical History:    Back problem    Cancer (HCC)    Cervical radiculopathy    Hypercholesterolemia    Lipid screening    Lumbar disc disease    Melanoma in situ of back (HCC)    Stage 0    Migraines    Unspecified essential hypertension      Past Surgical History:   Procedure Laterality Date    Colonoscopy  2016    Cortisone injection      both knee and right elbow. done at [facility]    Lasik      Shoulder arthroscopy Left     rotator cuff surgery - Ortho Dr Alegre       Family History   Problem Relation Age of Onset    Cancer Mother 76        colon cancer    Alcohol and Other Disorders Associated Father     Cancer Father 75        Non-Hodgkins lymphoma      Social History     Socioeconomic History    Marital status:      Spouse name: Not on file    Number of children: Not on file    Years of education: Not on file    Highest education level: Not on file   Occupational History    Not on file    Tobacco Use    Smoking status: Never     Passive exposure: Never    Smokeless tobacco: Never   Vaping Use    Vaping status: Never Used   Substance and Sexual Activity    Alcohol use: Yes     Comment: Very infrequent    Drug use: No    Sexual activity: Not on file   Other Topics Concern     Service Not Asked    Blood Transfusions Not Asked    Caffeine Concern No    Occupational Exposure Not Asked    Hobby Hazards Not Asked    Sleep Concern Not Asked    Stress Concern Not Asked    Weight Concern Not Asked    Special Diet Not Asked    Back Care Not Asked    Exercise Not Asked    Bike Helmet Not Asked    Seat Belt Not Asked    Self-Exams Not Asked   Social History Narrative    Not on file     Social Determinants of Health     Financial Resource Strain: Not on file   Food Insecurity: Not on file   Transportation Needs: Not on file   Physical Activity: Not on file   Stress: Not on file   Social Connections: Not on file   Housing Stability: Not on file        Allergies:  No Known Allergies      Medications:  Current Outpatient Medications   Medication Sig Dispense Refill    hydrOXYzine 25 MG Oral Tab Take 1 tablet (25 mg total) by mouth nightly.      sertraline 50 MG Oral Tab 25 mg po every day x7d, then 50 mg po daily thereafter 90 tablet 3    diazePAM 5 MG Oral Tab TAKE 1 TABLET (5 MG TOTAL) BY MOUTH 2 (TWO) TIMES DAILY AS NEEDED FOR ANXIETY. 60 tablet 5    SILODOSIN 8 MG Oral Cap TAKE 1 CAPSULE BY MOUTH ONCE DAILY 90 capsule 3    celecoxib 200 MG Oral Cap Take 1 capsule (200 mg total) by mouth daily. 90 capsule 3        Review of Systems:  A 10-point system was reviewed.  Pertinent positives and negatives are noted in HPI.      Physical Exam:  /86 (BP Location: Left arm, Patient Position: Sitting, Cuff Size: adult)   Pulse 63   Ht 76\"   Wt 230 lb (104.3 kg)   BMI 28.00 kg/m²         Neurological Exam:    AAOx3, following commands  PERRLA  EOMI  Face symmetrical  Tongue midline  Hearing symmetrical  and intact to finger rub    No rhinorrhea or otorrhea    Romberg negative    Motor Strength:  Intact    Sensation to light touch:  Intact    Incision:  Clean dry and intact    Abdomen:  Soft, non-distended, non-tender, with no rebound or guarding.  No peritoneal signs.     Extremities:  Non-tender, no lower extremity edema noted.      Labs:  CBC:  Lab Results   Component Value Date    WBC 7.7 10/30/2023    HGB 14.2 10/30/2023    HCT 42.0 10/30/2023    MCV 94.2 10/30/2023    .0 10/30/2023      BMG:  Lab Results   Component Value Date     10/06/2023    K 4.1 10/06/2023    CO2 27.0 10/06/2023     10/06/2023    BUN 12 10/06/2023      INR:  Lab Results   Component Value Date    INR 0.97 08/30/2023          Diagnostics:  I reviewed an x-ray of the lumbar spine.  There is L4-5 interbody fusion.  The hardware is properly positioned and there is no evidence of complications.    Diagnosis:  1. S/P lumbar fusion      Assessment/Plan:  R patient is doing great and is released from our care at this time.  The x-ray appears satisfactory.  He is encouraged to call with any questions in the future.    More than 30 minutes were spent during this visit and the coordination of this patient's care. All questions and concerns were addressed. We appreciate the opportunity to participate in the care of this patient. Please do not hesitate to call our office ([phone]) with any issues.        Ridge Orta M.D., F.A.A.N.S.    9/24/2024  8:28 AM    This note has been dictated utilizing voice recognition software. Unfortunately, this may lead to occasional typographical errors. If there are any questions regarding this, please do not hesitate to contact our office.

## 2024-09-24 NOTE — PATIENT INSTRUCTIONS
Refill policies:    Allow 2-3 business days for refills; controlled substances may take longer.  Contact your pharmacy at least 5 days prior to running out of medication and have them send an electronic request or submit request through the “request refill” option in your [facility] account.  Refills are not addressed on weekends; covering physicians do not authorize routine medications on weekends.  No narcotics or controlled substances are refilled after noon on Fridays or by on call physicians.  By law, narcotics must be electronically prescribed.  A 30 day supply with no refills is the maximum allowed.  If your prescription is due for a refill, you may be due for a follow up appointment.  To best provide you care, patients receiving routine medications need to be seen at least once a year.  Patients receiving narcotic/controlled substance medications need to be seen at least once every 3 months.  In the event that your preferred pharmacy does not have the requested medication in stock (e.g. Backordered), it is your responsibility to find another pharmacy that has the requested medication available.  We will gladly send a new prescription to that pharmacy at your request.    Scheduling Tests:    If your physician has ordered radiology tests such as MRI or CT scans, please contact Central Scheduling at [phone] right away to schedule the test.  Once scheduled, the [facility] Centralized Referral Team will work with your insurance carrier to obtain pre-certification or prior authorization.  Depending on your insurance carrier, approval may take 3-10 days.  It is highly recommended patients assure they have received an authorization before having a test performed.  If test is done without insurance authorization, patient may be responsible for the entire amount billed.      Precertification and Prior Authorizations:  If your physician has recommended that you have a procedure or additional testing performed the [facility]  Centralized Referral Team will contact your insurance carrier to obtain pre-certification or prior authorization.    You are strongly encouraged to contact your insurance carrier to verify that your procedure/test has been approved and is a COVERED benefit.  Although the [facility] Centralized Referral Team does its due diligence, the insurance carrier gives the disclaimer that \"Although the procedure is authorized, this does not guarantee payment.\"    Ultimately the patient is responsible for payment.   Thank you for your understanding in this matter.  Paperwork Completion:  If you require FMLA or disability paperwork for your recovery, please make sure to either drop it off or have it faxed to our office at [phone]. Be sure the form has your name and date of birth on it.  The form will be faxed to our Forms Department and they will complete it for you.  There is a 25$ fee for all forms that need to be filled out.  Please be aware there is a 10-14 day turnaround time.  You will need to sign a release of information (TYRELL) form if your paperwork does not come with one.  You may call the Forms Department with any questions at [phone].  Their fax number is [phone].

## 2024-09-24 NOTE — PROGRESS NOTES
Established patient:  Reason for follow up: 1 year   L3-4 and L4-5 laminectomy for resection of extradural cyst and L4-5 right transforaminal lumbar interbody fusion N/A General   POSTERIOR LUMBAR INTERBODY FUSION - MIS TLIF 1 LEVEL         Numeric Rating Scale:        Pain at Present:  0/10         Most recent treatments for Current Pain Condition:   Physical Therapy and Surgery  Response to treatment: complete resolution    New imaging or testing since your last office visit:    XR lumbar spine 9.11.24

## 2024-10-20 ENCOUNTER — TELEPHONE (OUTPATIENT)
Dept: INTERNAL MEDICINE CLINIC | Facility: CLINIC | Age: 59
End: 2024-10-20

## 2024-10-20 DIAGNOSIS — F41.9 ANXIETY: ICD-10-CM

## 2024-10-21 NOTE — TELEPHONE ENCOUNTER
To MD:  The above refill request is for a controlled substance.  Please review pended medication order.   Print and sign for staff to fax to pharmacy or prescribe electronically.    Last office visit: 2/22/2024  Last time refill sent and quantity/refills:    Last ordered on 4/5/2024, #60/5  Per [place]  last dispensed on 9/20/, #60/0

## 2024-10-22 RX ORDER — DIAZEPAM 5 MG/1
TABLET ORAL
Qty: 60 TABLET | Refills: 5 | Status: SHIPPED | OUTPATIENT
Start: 2024-10-22

## 2024-12-26 RX ORDER — CELECOXIB 200 MG/1
200 CAPSULE ORAL DAILY
Qty: 90 CAPSULE | Refills: 0 | Status: SHIPPED | OUTPATIENT
Start: 2024-12-26

## 2024-12-26 NOTE — TELEPHONE ENCOUNTER
Will be due for appt in Feb. Mychart sent     Refill request is for a maintenance medication and has met the criteria specified in the Ambulatory Medication Refill Standing Order for eligibility, visits, laboratory, alerts and was sent to the requested pharmacy.    Requested Prescriptions     Signed Prescriptions Disp Refills    celecoxib 200 MG Oral Cap 90 capsule 0     Sig: TAKE 1 CAPSULE BY MOUTH EVERY DAY     Authorizing Provider: SALEEM URIBE     Ordering User: SHAE MCCLELLAND

## 2025-01-23 ENCOUNTER — APPOINTMENT (OUTPATIENT)
Dept: URBAN - METROPOLITAN AREA CLINIC 244 | Age: 60
Setting detail: DERMATOLOGY
End: 2025-01-29

## 2025-01-23 DIAGNOSIS — L82.1 OTHER SEBORRHEIC KERATOSIS: ICD-10-CM

## 2025-01-23 DIAGNOSIS — D22 MELANOCYTIC NEVI: ICD-10-CM

## 2025-01-23 DIAGNOSIS — Z12.83 ENCOUNTER FOR SCREENING FOR MALIGNANT NEOPLASM OF SKIN: ICD-10-CM

## 2025-01-23 DIAGNOSIS — L81.4 OTHER MELANIN HYPERPIGMENTATION: ICD-10-CM

## 2025-01-23 DIAGNOSIS — Z87.2 PERSONAL HISTORY OF DISEASES OF THE SKIN AND SUBCUTANEOUS TISSUE: ICD-10-CM

## 2025-01-23 DIAGNOSIS — Z85.828 PERSONAL HISTORY OF OTHER MALIGNANT NEOPLASM OF SKIN: ICD-10-CM

## 2025-01-23 DIAGNOSIS — Z85.820 PERSONAL HISTORY OF MALIGNANT MELANOMA OF SKIN: ICD-10-CM

## 2025-01-23 PROBLEM — D48.5 NEOPLASM OF UNCERTAIN BEHAVIOR OF SKIN: Status: ACTIVE | Noted: 2025-01-23

## 2025-01-23 PROBLEM — D22.9 MELANOCYTIC NEVI, UNSPECIFIED: Status: ACTIVE | Noted: 2025-01-23

## 2025-01-23 PROBLEM — D22.61 MELANOCYTIC NEVI OF RIGHT UPPER LIMB, INCLUDING SHOULDER: Status: ACTIVE | Noted: 2025-01-23

## 2025-01-23 PROCEDURE — OTHER MIPS QUALITY: OTHER

## 2025-01-23 PROCEDURE — 11104 PUNCH BX SKIN SINGLE LESION: CPT

## 2025-01-23 PROCEDURE — OTHER COUNSELING: OTHER

## 2025-01-23 PROCEDURE — OTHER BIOPSY BY SHAVE METHOD: OTHER

## 2025-01-23 PROCEDURE — 11103 TANGNTL BX SKIN EA SEP/ADDL: CPT | Mod: 59

## 2025-01-23 PROCEDURE — OTHER BIOPSY BY PUNCH METHOD: OTHER

## 2025-01-23 PROCEDURE — 69100 BIOPSY OF EXTERNAL EAR: CPT | Mod: 59

## 2025-01-23 PROCEDURE — OTHER DIAGNOSIS COMMENT: OTHER

## 2025-01-23 PROCEDURE — 99215 OFFICE O/P EST HI 40 MIN: CPT | Mod: 25

## 2025-01-23 ASSESSMENT — LOCATION SIMPLE DESCRIPTION DERM
LOCATION SIMPLE: LOWER BACK
LOCATION SIMPLE: LEFT UPPER BACK
LOCATION SIMPLE: ABDOMEN
LOCATION SIMPLE: RIGHT BUTTOCK
LOCATION SIMPLE: RIGHT FOREARM
LOCATION SIMPLE: RIGHT LOWER BACK
LOCATION SIMPLE: RIGHT UPPER BACK
LOCATION SIMPLE: RIGHT UPPER ARM

## 2025-01-23 ASSESSMENT — LOCATION DETAILED DESCRIPTION DERM
LOCATION DETAILED: PERIUMBILICAL SKIN
LOCATION DETAILED: LEFT SUPERIOR UPPER BACK
LOCATION DETAILED: RIGHT PROXIMAL LATERAL POSTERIOR UPPER ARM
LOCATION DETAILED: RIGHT INFERIOR LATERAL MIDBACK
LOCATION DETAILED: LEFT INFERIOR MEDIAL UPPER BACK
LOCATION DETAILED: LEFT SUPERIOR MEDIAL UPPER BACK
LOCATION DETAILED: RIGHT INFERIOR UPPER BACK
LOCATION DETAILED: RIGHT INFERIOR MEDIAL UPPER BACK
LOCATION DETAILED: RIGHT BUTTOCK
LOCATION DETAILED: SUPERIOR LUMBAR SPINE
LOCATION DETAILED: RIGHT INFERIOR MEDIAL MIDBACK
LOCATION DETAILED: LEFT LATERAL ABDOMEN
LOCATION DETAILED: RIGHT DISTAL RADIAL DORSAL FOREARM
LOCATION DETAILED: RIGHT SUPERIOR LATERAL LOWER BACK

## 2025-01-23 ASSESSMENT — LOCATION ZONE DERM
LOCATION ZONE: ARM
LOCATION ZONE: TRUNK

## 2025-01-23 NOTE — PROCEDURE: DIAGNOSIS COMMENT
Comment: Melanoma in situ  L lower back Exc 8/2016,\\nMelanoma in situ Upper back R of midline 5/26/2021\\n\\nROS reviewed today:  per pt has no lumps/bumps, headaches, unintentional WL, vision changes, loss of appetite, changes at removal site, or new / concerning moles
Patient Management Risk Assessment: High
Render Risk Assessment In Note?: yes
Detail Level: Simple

## 2025-01-23 NOTE — PROCEDURE: COUNSELING
Detail Level: Simple
Detail Level: Generalized
Detail Level: Detailed
When Should The Patient Follow-Up For Their Next Full-Body Skin Exam?: 3 Months

## 2025-01-23 NOTE — PROCEDURE: BIOPSY BY SHAVE METHOD
Hide Second Anesthesia?: No
Billing Type: Third-Party Bill
Detail Level: Detailed
X Size Of Lesion In Cm: 0
Electrodesiccation And Curettage Text: The wound bed was treated with electrodesiccation and curettage after the biopsy was performed.
Wound Care: Petrolatum
Type Of Destruction Used: Curettage
Consent: Written consent was obtained and risks were reviewed including but not limited to scarring, infection, bleeding, scabbing, incomplete removal, nerve damage and allergy to anesthesia.
Curettage Text: The wound bed was treated with curettage after the biopsy was performed.
Biopsy Type: H and E
Information: Selecting Yes will display possible errors in your note based on the variables you have selected. This validation is only offered as a suggestion for you. PLEASE NOTE THAT THE VALIDATION TEXT WILL BE REMOVED WHEN YOU FINALIZE YOUR NOTE. IF YOU WANT TO FAX A PRELIMINARY NOTE YOU WILL NEED TO TOGGLE THIS TO 'NO' IF YOU DO NOT WANT IT IN YOUR FAXED NOTE.
Cryotherapy Text: The wound bed was treated with cryotherapy after the biopsy was performed.
Biopsy Method: double edge Personna blade
Anesthesia Volume In Cc: 0.5
Dressing: bandage
Electrodesiccation Text: The wound bed was treated with electrodesiccation after the biopsy was performed.
Notification Instructions: Patient will be notified of biopsy results. However, patient instructed to call the office if not contacted within 2 weeks.
Lab: -[id number]
Post-Care Instructions: I reviewed with the patient in detail post-care instructions. Patient is to keep the biopsy site dry overnight, and then apply bacitracin twice daily until healed. Patient may apply hydrogen peroxide soaks to remove any crusting.
Hemostasis: Drysol
Depth Of Biopsy: dermis
Silver Nitrate Text: The wound bed was treated with silver nitrate after the biopsy was performed.
Was A Bandage Applied: Yes
Anesthesia Type: 0.5% lidocaine with 1:100,000 epinephrine and a 1:10 solution of 8.4% sodium bicarbonate
Post-Care Instructions: Reviewed with the patient in detail post-care instructions. Patient is to keep the area dry for 48 hours, and not to engage in any swimming until the area is healed. If patient does have water exposure, recommend waterproof (hydrocolloid dressing) application. Should the patient develop any fevers, chills, bleeding, severe pain, then the patient will contact the office immediately.** The patient was explicitly instructed to NOT apply any other topicals to the area other than plain vaseline with a clean Q-tip **. No Neosporin or topical Ab unless prescribed/recommended by the Dr is to be used. The patient is aware to have the area re-evaluated if it is not healed. Patient aware that if any bleeding, growing, or the site worsened after the biopsy then we should be notified and the area re-evaluated.
Consent: Written consent was obtained and risks were reviewed including but not limited to scarring, infection, bleeding, scabbing, incomplete removal, nerve damage and allergy to anesthesia. The scar produced with a shave biopsy may appear atrophic and white or rarely keloidal/hypertrophic. Patient aware that I cannot guarantee cosmetic outcome of biopsy and biopsy is done for medical purposes (not cosmetic). Patient aware that sometimes biopsy results can be inconclusive and that another biopsy or procedure may be required.
Medical Necessity Information: It is in your best interest to select a reason for this procedure from the list below. All of these items fulfill various CMS LCD requirements except the new and changing color options.
Anesthesia Type: 0.5% lidocaine with 1:200,000 epinephrine and a 1:10 solution of 8.4% sodium bicarbonate
Dressing: Band-Aid

## 2025-01-23 NOTE — PROCEDURE: BIOPSY BY PUNCH METHOD
X Size Of Lesion In Cm (Optional): 0
Dressing: Band-Aid
Render Path Notes In Note?: No
Home Suture Removal Text: Patient was provided a home suture removal kit and will remove their sutures at home.  If they have any questions or difficulties they will call the office.
Anesthesia Volume In Cc: 1
Lab: -[id number]
Was A Bandage Applied: Yes
Biopsy Type: H and E
Depth Of Punch Biopsy: dermis
Wound Care: Petrolatum
Billing Type: Third-Party Bill
Detail Level: Detailed
Post-Care Instructions: Post care instructions reviewed with pt. Pt to apply vaseline daily with clean cotton tip to encourage healing and recommend waterproof (hydrocolloid) dressings if water exposure. Not to apply any other OTC topicals including neosporin. Pt to contact office for evaluation if non healing wound present, the area worsens, or if pt develops fevers, chills, bleeding, severe pain.
Suture Removal: 14 days
Anesthesia Type: 0.5% lidocaine with 1:200,000 epinephrine and a 1:10 solution of 8.4% sodium bicarbonate
Notification Instructions: Patient will be notified of biopsy results. However, patient instructed to call the office if not contacted within 2 weeks.
Punch Size In Mm: 4
Epidermal Sutures: 4-0 Nylon
Information: Selecting Yes will display possible errors in your note based on the variables you have selected. This validation is only offered as a suggestion for you. PLEASE NOTE THAT THE VALIDATION TEXT WILL BE REMOVED WHEN YOU FINALIZE YOUR NOTE. IF YOU WANT TO FAX A PRELIMINARY NOTE YOU WILL NEED TO TOGGLE THIS TO 'NO' IF YOU DO NOT WANT IT IN YOUR FAXED NOTE.
Consent: Written consent was obtained and risks were reviewed including but not limited to scarring, infection, wound dehiscernce, bleeding, scabbing, incomplete removal, nerve damage and allergy to anesthesia.\\n\\nPatient aware that I cannot guarantee the cosmetic outcome of bx and biopsy is done for medical purposes (not cosmetic).\\n\\nPatient aware that sometimes biopsy results can be inconclusive and that another biopsy or procedure may be required.
Hemostasis: None

## 2025-02-04 ENCOUNTER — APPOINTMENT (OUTPATIENT)
Dept: URBAN - METROPOLITAN AREA CLINIC 244 | Age: 60
Setting detail: DERMATOLOGY
End: 2025-02-04

## 2025-02-04 DIAGNOSIS — Z48.02 ENCOUNTER FOR REMOVAL OF SUTURES: ICD-10-CM

## 2025-02-04 PROCEDURE — OTHER DIAGNOSIS COMMENT: OTHER

## 2025-02-04 PROCEDURE — 99024 POSTOP FOLLOW-UP VISIT: CPT

## 2025-02-04 PROCEDURE — OTHER SUTURE REMOVAL (GLOBAL PERIOD): OTHER

## 2025-02-04 ASSESSMENT — LOCATION DETAILED DESCRIPTION DERM: LOCATION DETAILED: LEFT LATERAL ABDOMEN

## 2025-02-04 ASSESSMENT — LOCATION ZONE DERM: LOCATION ZONE: TRUNK

## 2025-02-04 ASSESSMENT — LOCATION SIMPLE DESCRIPTION DERM: LOCATION SIMPLE: ABDOMEN

## 2025-02-04 NOTE — PROCEDURE: SUTURE REMOVAL (GLOBAL PERIOD)
Detail Level: Detailed
Add 54669 Cpt? (Important Note: In 2017 The Use Of 20597 Is Being Tracked By Cms To Determine Future Global Period Reimbursement For Global Periods): yes

## 2025-02-04 NOTE — PROCEDURE: DIAGNOSIS COMMENT
Detail Level: Simple
Render Risk Assessment In Note?: no
Comment: Pt will return for AK on the ear. Is already scheduled to come back on April

## 2025-03-23 ENCOUNTER — TELEPHONE (OUTPATIENT)
Dept: INTERNAL MEDICINE CLINIC | Facility: CLINIC | Age: 60
End: 2025-03-23

## 2025-03-23 DIAGNOSIS — Z12.5 SCREENING FOR PROSTATE CANCER: ICD-10-CM

## 2025-03-23 DIAGNOSIS — Z00.00 ANNUAL PHYSICAL EXAM: Primary | ICD-10-CM

## 2025-04-02 RX ORDER — SILODOSIN 8 MG/1
8 CAPSULE ORAL DAILY
Qty: 90 CAPSULE | Refills: 0 | Status: SHIPPED | OUTPATIENT
Start: 2025-04-02

## 2025-04-02 NOTE — TELEPHONE ENCOUNTER
Per 2/22/24 OV--   CKD  Creatinine 1.38 on 8/30/23; was taking Advil 800 mg po BID, though stopped one month ago;   -avoid NSAIDs; discussed stopping Celebrex; may take Tylenol prn    Spoke with patient and he states that he has continued to take Celebrex. He tried stopping the medication about 1.5 years ago and \"it did not go well.\" Patient does not feel that Tylenol is helpful for pain.    Routed to Dr. Iyer patient is requesting a refill on Celebrex. Please advise on pending refill #90- patient is scheduled to see you on 6/2/25.   Labs for annual pending per protocol as well. Thanks!!

## 2025-04-02 NOTE — TELEPHONE ENCOUNTER
Refill request is for a maintenance medication and has met the criteria specified in the Ambulatory Medication Refill Standing Order for eligibility, visits, laboratory, alerts and was sent to the requested pharmacy.    Requested Prescriptions     Signed Prescriptions Disp Refills    silodosin 8 MG Oral Cap 90 capsule 0     Sig: TAKE 1 CAPSULE BY MOUTH EVERY DAY     Authorizing Provider: SALEEM URIBE     Ordering User: KARLENE ROMERO      90 day supply sent.

## 2025-04-02 NOTE — TELEPHONE ENCOUNTER
Patient called to check on status of refill request.     Patient scheduled Annual Physical on 6/2/25.

## 2025-04-03 RX ORDER — CELECOXIB 200 MG/1
200 CAPSULE ORAL DAILY
Qty: 90 CAPSULE | Refills: 3 | Status: SHIPPED | OUTPATIENT
Start: 2025-04-03

## 2025-04-03 NOTE — TELEPHONE ENCOUNTER
Last creatinine 1.04 in Feb 2024    OK to refill Celebrex 200 mg po every day #90, 3 RF    ERx sent; Fasting labs ordered; please notify pt

## 2025-04-21 ENCOUNTER — APPOINTMENT (OUTPATIENT)
Dept: URBAN - METROPOLITAN AREA CLINIC 244 | Age: 60
Setting detail: DERMATOLOGY
End: 2025-04-23

## 2025-04-21 DIAGNOSIS — Z85.828 PERSONAL HISTORY OF OTHER MALIGNANT NEOPLASM OF SKIN: ICD-10-CM

## 2025-04-21 DIAGNOSIS — L81.4 OTHER MELANIN HYPERPIGMENTATION: ICD-10-CM

## 2025-04-21 DIAGNOSIS — Z12.83 ENCOUNTER FOR SCREENING FOR MALIGNANT NEOPLASM OF SKIN: ICD-10-CM

## 2025-04-21 DIAGNOSIS — Z87.2 PERSONAL HISTORY OF DISEASES OF THE SKIN AND SUBCUTANEOUS TISSUE: ICD-10-CM

## 2025-04-21 DIAGNOSIS — Z85.820 PERSONAL HISTORY OF MALIGNANT MELANOMA OF SKIN: ICD-10-CM

## 2025-04-21 DIAGNOSIS — D22 MELANOCYTIC NEVI: ICD-10-CM

## 2025-04-21 DIAGNOSIS — L82.1 OTHER SEBORRHEIC KERATOSIS: ICD-10-CM

## 2025-04-21 PROBLEM — D48.5 NEOPLASM OF UNCERTAIN BEHAVIOR OF SKIN: Status: ACTIVE | Noted: 2025-04-21

## 2025-04-21 PROBLEM — D22.9 MELANOCYTIC NEVI, UNSPECIFIED: Status: ACTIVE | Noted: 2025-04-21

## 2025-04-21 PROCEDURE — 99213 OFFICE O/P EST LOW 20 MIN: CPT | Mod: 25

## 2025-04-21 PROCEDURE — OTHER COUNSELING: OTHER

## 2025-04-21 PROCEDURE — OTHER MIPS QUALITY: OTHER

## 2025-04-21 PROCEDURE — 11301 SHAVE SKIN LESION 0.6-1.0 CM: CPT

## 2025-04-21 PROCEDURE — OTHER SHAVE REMOVAL: OTHER

## 2025-04-21 PROCEDURE — OTHER DIAGNOSIS COMMENT: OTHER

## 2025-04-21 ASSESSMENT — LOCATION DETAILED DESCRIPTION DERM
LOCATION DETAILED: RIGHT BUTTOCK
LOCATION DETAILED: PERIUMBILICAL SKIN
LOCATION DETAILED: LEFT SUPERIOR UPPER BACK
LOCATION DETAILED: RIGHT SUPERIOR LATERAL LOWER BACK
LOCATION DETAILED: SUPERIOR LUMBAR SPINE
LOCATION DETAILED: RIGHT INFERIOR LATERAL MIDBACK
LOCATION DETAILED: RIGHT INFERIOR UPPER BACK
LOCATION DETAILED: LEFT INFERIOR CRUS OF ANTIHELIX
LOCATION DETAILED: RIGHT DISTAL RADIAL DORSAL FOREARM
LOCATION DETAILED: RIGHT INFERIOR MEDIAL UPPER BACK
LOCATION DETAILED: RIGHT PROXIMAL LATERAL POSTERIOR UPPER ARM
LOCATION DETAILED: LEFT INFERIOR MEDIAL UPPER BACK
LOCATION DETAILED: RIGHT INFERIOR MEDIAL MIDBACK
LOCATION DETAILED: LEFT INFERIOR MEDIAL MIDBACK
LOCATION DETAILED: LEFT SUPERIOR MEDIAL UPPER BACK

## 2025-04-21 ASSESSMENT — LOCATION SIMPLE DESCRIPTION DERM
LOCATION SIMPLE: RIGHT UPPER ARM
LOCATION SIMPLE: LOWER BACK
LOCATION SIMPLE: RIGHT FOREARM
LOCATION SIMPLE: RIGHT BUTTOCK
LOCATION SIMPLE: LEFT LOWER BACK
LOCATION SIMPLE: ABDOMEN
LOCATION SIMPLE: RIGHT LOWER BACK
LOCATION SIMPLE: LEFT UPPER BACK
LOCATION SIMPLE: RIGHT UPPER BACK
LOCATION SIMPLE: LEFT EAR

## 2025-04-21 ASSESSMENT — LOCATION ZONE DERM
LOCATION ZONE: ARM
LOCATION ZONE: EAR
LOCATION ZONE: TRUNK

## 2025-04-21 NOTE — PROCEDURE: SHAVE REMOVAL
Wound Care: Petrolatum
Anesthesia Type: 0.5% lidocaine with 1:200,000 epinephrine and a 1:10 solution of 8.4% sodium bicarbonate
Hide Shave Removal Depth?: No
Render Post-Care Instructions In Note?: yes
Size Of Margin In Cm (Margins Are Not Added To Billing Dimensions): 0.2
Medical Necessity Clause: This procedure was medically necessary because the lesion that was treated was:
Lab Facility: 0
Billing Type: Third-Party Bill
Depth Of Shave: dermis
Biopsy Method: double edge Personna blade
Consent was obtained from the patient. The risks and benefits were discussed in detail. Specifically, the risks of infection, scarring, scabbing, bleeding, prolonged wound healing, incomplete removal, allergy to anesthesia, nerve injury and recurrence were addressed. Prior to the procedure, the treatment site was clearly identified and confirmed by the patient. The scar produced with a shave removal typically appears atrophic and white but can be raised / hypertrophic / keloidal.  Patient aware removal is done for medical purposes (not cosmetic). Patient aware that sometimes removal results can be inconclusive and that another removal or procedure may be required. Patient aware that another procedure may be required pending the diagnosis. \\nThe rational for the shave removal today was explained to the patient, specifically that smaller sampling without margins could result in inadeqaute result or the need for another procedure for definitive treatment. The removal was not done for cosmetic purposes and pt was given the opportunity to obtain second opinion. Pt allowed the opportunity to also return for removal if they prefer, but removal was offered today due to the medical indication given findings. All of patient's questions were answered to their satisfaction and they wished to proceed.
Medical Necessity Information: It is in your best interest to select a reason for this procedure from the list below. All of these items fulfill various CMS LCD requirements except the new and changing color options.
Lab: -[id number]
Hemostasis: Drysol
Detail Level: Detailed
Post-Care Instructions: I reviewed with the patient in detail post-care instructions.\\n\\n Following the removal petrolatum and a bandage were applied. Patient will be notified of biopsy results. However, patient instructed to call the office if not contacted within 2 weeks. Reviewed with the patient in detail post-care instructions. Patient is to keep the area dry for 48 hours, and not to engage in any swimming until the area is healed. If patient does have water exposure, recommend waterproof (hydrocolloid dressing) application. Should the patient develop any fevers, chills, bleeding, severe pain, then the patient will contact the office immediately.\\n\\n** The patient was explicitly instructed to NOT apply any other topicals to the area other than plain vaseline with a clean Q-tip **. No Neosporin or topical Ab unless prescribed/recommended by the Dr is to be used.\\n\\nThe patient is aware to have the area re-evaluated if it is not healed
Notification Instructions: Patient will be notified of pathology results. However, patient instructed to call the office if not contacted within 2 weeks.
Size Of Lesion In Cm (Required): 0.9
Anesthesia Volume In Cc: 1

## 2025-04-21 NOTE — PROCEDURE: DIAGNOSIS COMMENT
Comment: Melanoma in situ  L lower back Exc 8/2016,\\nMelanoma in situ Upper back R of midline 5/26/2021\\n\\nROS reviewed today:  per pt has no lumps/bumps, headaches, unintentional WL, vision changes, loss of appetite, changes at removal site, or new / concerning moles
Render Risk Assessment In Note?: yes
Detail Level: Simple

## 2025-04-21 NOTE — PROCEDURE: COUNSELING
Detail Level: Detailed
Quality 137: Melanoma: Continuity Of Care - Recall System: Patient information entered into a recall system that includes: target date for the next exam specified AND a process to follow up with patients regarding missed or unscheduled appointments
When Should The Patient Follow-Up For Their Next Full-Body Skin Exam?: 3 Months
Detail Level: Simple
Detail Level: Generalized
Sunscreen Recommendations: Brands include neutrogena, La-Roche, and Elta-MD. Rec mineral sunscreen use (zinc/titanium dioxide) over chemical sunscreens due to safety.
Nicotinamide Supplementation Recommendations: All supplements should be USP (https://www.usp.org/verification-services/verified-ila).

## 2025-05-23 ENCOUNTER — TELEPHONE (OUTPATIENT)
Dept: INTERNAL MEDICINE CLINIC | Facility: CLINIC | Age: 60
End: 2025-05-23

## 2025-05-23 DIAGNOSIS — F41.9 ANXIETY: ICD-10-CM

## 2025-05-23 RX ORDER — DIAZEPAM 5 MG/1
TABLET ORAL
Qty: 60 TABLET | Refills: 5 | Status: SHIPPED | OUTPATIENT
Start: 2025-05-23

## 2025-05-23 NOTE — TELEPHONE ENCOUNTER
To MD:  The above refill request is for a controlled substance.  Please review pended medication order.   Print and sign for staff to fax to pharmacy or prescribe electronically.    Last office visit: 2/22/2024 (upcoming appt. 6/2/2025)  Last time refill sent and quantity/refills: 10/22/2024 #60 with 5 refills

## 2025-06-02 ENCOUNTER — OFFICE VISIT (OUTPATIENT)
Dept: INTERNAL MEDICINE CLINIC | Facility: CLINIC | Age: 60
End: 2025-06-02

## 2025-06-02 ENCOUNTER — LAB ENCOUNTER (OUTPATIENT)
Dept: LAB | Age: 60
End: 2025-06-02
Attending: OCCUPATIONAL THERAPIST
Payer: COMMERCIAL

## 2025-06-02 VITALS
BODY MASS INDEX: 28.49 KG/M2 | DIASTOLIC BLOOD PRESSURE: 88 MMHG | WEIGHT: 234 LBS | OXYGEN SATURATION: 98 % | HEART RATE: 67 BPM | TEMPERATURE: 98 F | HEIGHT: 76 IN | SYSTOLIC BLOOD PRESSURE: 130 MMHG | RESPIRATION RATE: 16 BRPM

## 2025-06-02 DIAGNOSIS — G25.81 RLS (RESTLESS LEGS SYNDROME): ICD-10-CM

## 2025-06-02 DIAGNOSIS — J30.89 ALLERGIC RHINITIS DUE TO OTHER ALLERGIC TRIGGER, UNSPECIFIED SEASONALITY: ICD-10-CM

## 2025-06-02 DIAGNOSIS — F41.9 ANXIETY: ICD-10-CM

## 2025-06-02 DIAGNOSIS — I45.10 RBBB: ICD-10-CM

## 2025-06-02 DIAGNOSIS — I49.1 PAC (PREMATURE ATRIAL CONTRACTION): ICD-10-CM

## 2025-06-02 DIAGNOSIS — Z87.39 HISTORY OF ROTATOR CUFF TEAR: ICD-10-CM

## 2025-06-02 DIAGNOSIS — D03.8 MELANOMA IN SITU OF OTHER SITE (HCC): ICD-10-CM

## 2025-06-02 DIAGNOSIS — F51.01 PRIMARY INSOMNIA: ICD-10-CM

## 2025-06-02 DIAGNOSIS — N40.0 BENIGN PROSTATIC HYPERPLASIA, UNSPECIFIED WHETHER LOWER URINARY TRACT SYMPTOMS PRESENT: ICD-10-CM

## 2025-06-02 DIAGNOSIS — M48.061 SPINAL STENOSIS OF LUMBAR REGION, UNSPECIFIED WHETHER NEUROGENIC CLAUDICATION PRESENT: ICD-10-CM

## 2025-06-02 DIAGNOSIS — Z86.69 HISTORY OF MIGRAINE: ICD-10-CM

## 2025-06-02 DIAGNOSIS — R51.9 NONINTRACTABLE HEADACHE, UNSPECIFIED CHRONICITY PATTERN, UNSPECIFIED HEADACHE TYPE: ICD-10-CM

## 2025-06-02 DIAGNOSIS — E78.00 HYPERCHOLESTEREMIA: ICD-10-CM

## 2025-06-02 DIAGNOSIS — Z00.00 ANNUAL PHYSICAL EXAM: Primary | ICD-10-CM

## 2025-06-02 DIAGNOSIS — M17.0 PRIMARY OSTEOARTHRITIS OF BOTH KNEES: ICD-10-CM

## 2025-06-02 DIAGNOSIS — Z12.5 SCREENING FOR PROSTATE CANCER: ICD-10-CM

## 2025-06-02 LAB
ALBUMIN SERPL-MCNC: 4.6 G/DL (ref 3.2–4.8)
ALBUMIN/GLOB SERPL: 2.2 {RATIO} (ref 1–2)
ALP LIVER SERPL-CCNC: 65 U/L (ref 45–117)
ALT SERPL-CCNC: 17 U/L (ref 10–49)
ANION GAP SERPL CALC-SCNC: 7 MMOL/L (ref 0–18)
AST SERPL-CCNC: 22 U/L (ref ?–34)
BASOPHILS # BLD AUTO: 0.05 X10(3) UL (ref 0–0.2)
BASOPHILS NFR BLD AUTO: 1 %
BILIRUB SERPL-MCNC: 1.2 MG/DL (ref 0.3–1.2)
BUN BLD-MCNC: 15 MG/DL (ref 9–23)
BUN/CREAT SERPL: 11.6 (ref 10–20)
CALCIUM BLD-MCNC: 9.6 MG/DL (ref 8.7–10.4)
CHLORIDE SERPL-SCNC: 108 MMOL/L (ref 98–112)
CHOLEST SERPL-MCNC: 252 MG/DL (ref ?–200)
CO2 SERPL-SCNC: 27 MMOL/L (ref 21–32)
COMPLEXED PSA SERPL-MCNC: 0.4 NG/ML (ref ?–4)
CREAT BLD-MCNC: 1.29 MG/DL (ref 0.7–1.3)
DEPRECATED RDW RBC AUTO: 44.6 FL (ref 35.1–46.3)
EGFRCR SERPLBLD CKD-EPI 2021: 64 ML/MIN/1.73M2 (ref 60–?)
EOSINOPHIL # BLD AUTO: 0.12 X10(3) UL (ref 0–0.7)
EOSINOPHIL NFR BLD AUTO: 2.3 %
ERYTHROCYTE [DISTWIDTH] IN BLOOD BY AUTOMATED COUNT: 12.7 % (ref 11–15)
FASTING PATIENT LIPID ANSWER: YES
FASTING STATUS PATIENT QL REPORTED: YES
GLOBULIN PLAS-MCNC: 2.1 G/DL (ref 2–3.5)
GLUCOSE BLD-MCNC: 87 MG/DL (ref 70–99)
HCT VFR BLD AUTO: 42.4 % (ref 39–53)
HDLC SERPL-MCNC: 48 MG/DL (ref 40–59)
HGB BLD-MCNC: 14.8 G/DL (ref 13–17.5)
IMM GRANULOCYTES # BLD AUTO: 0.01 X10(3) UL (ref 0–1)
IMM GRANULOCYTES NFR BLD: 0.2 %
LDLC SERPL CALC-MCNC: 184 MG/DL (ref ?–100)
LYMPHOCYTES # BLD AUTO: 1.94 X10(3) UL (ref 1–4)
LYMPHOCYTES NFR BLD AUTO: 38 %
MCH RBC QN AUTO: 33.1 PG (ref 26–34)
MCHC RBC AUTO-ENTMCNC: 34.9 G/DL (ref 31–37)
MCV RBC AUTO: 94.9 FL (ref 80–100)
MONOCYTES # BLD AUTO: 0.4 X10(3) UL (ref 0.1–1)
MONOCYTES NFR BLD AUTO: 7.8 %
NEUTROPHILS # BLD AUTO: 2.59 X10 (3) UL (ref 1.5–7.7)
NEUTROPHILS # BLD AUTO: 2.59 X10(3) UL (ref 1.5–7.7)
NEUTROPHILS NFR BLD AUTO: 50.7 %
NONHDLC SERPL-MCNC: 204 MG/DL (ref ?–130)
OSMOLALITY SERPL CALC.SUM OF ELEC: 294 MOSM/KG (ref 275–295)
PLATELET # BLD AUTO: 244 10(3)UL (ref 150–450)
POTASSIUM SERPL-SCNC: 4.8 MMOL/L (ref 3.5–5.1)
PROT SERPL-MCNC: 6.7 G/DL (ref 5.7–8.2)
RBC # BLD AUTO: 4.47 X10(6)UL (ref 4.3–5.7)
SODIUM SERPL-SCNC: 142 MMOL/L (ref 136–145)
T4 FREE SERPL-MCNC: 1.2 NG/DL (ref 0.8–1.7)
TRIGL SERPL-MCNC: 110 MG/DL (ref 30–149)
TSI SER-ACNC: 5.46 UIU/ML (ref 0.55–4.78)
VLDLC SERPL CALC-MCNC: 23 MG/DL (ref 0–30)
WBC # BLD AUTO: 5.1 X10(3) UL (ref 4–11)

## 2025-06-02 PROCEDURE — 85025 COMPLETE CBC W/AUTO DIFF WBC: CPT | Performed by: INTERNAL MEDICINE

## 2025-06-02 PROCEDURE — 84443 ASSAY THYROID STIM HORMONE: CPT | Performed by: INTERNAL MEDICINE

## 2025-06-02 PROCEDURE — 36415 COLL VENOUS BLD VENIPUNCTURE: CPT | Performed by: INTERNAL MEDICINE

## 2025-06-02 PROCEDURE — 80053 COMPREHEN METABOLIC PANEL: CPT | Performed by: INTERNAL MEDICINE

## 2025-06-02 PROCEDURE — 99396 PREV VISIT EST AGE 40-64: CPT | Performed by: INTERNAL MEDICINE

## 2025-06-02 PROCEDURE — 80061 LIPID PANEL: CPT | Performed by: INTERNAL MEDICINE

## 2025-06-02 PROCEDURE — 84439 ASSAY OF FREE THYROXINE: CPT | Performed by: INTERNAL MEDICINE

## 2025-06-02 RX ORDER — SILODOSIN 8 MG/1
8 CAPSULE ORAL DAILY
Qty: 90 CAPSULE | Refills: 3 | Status: SHIPPED | OUTPATIENT
Start: 2025-06-02

## 2025-06-02 RX ORDER — DIAZEPAM 5 MG/1
5 TABLET ORAL EVERY 8 HOURS PRN
Qty: 90 TABLET | Refills: 5 | Status: SHIPPED | OUTPATIENT
Start: 2025-06-15

## 2025-06-02 RX ORDER — HYDROCODONE BITARTRATE AND ACETAMINOPHEN 5; 325 MG/1; MG/1
1-2 TABLET ORAL EVERY 4 HOURS PRN
Qty: 20 TABLET | Refills: 0 | Status: SHIPPED | OUTPATIENT
Start: 2025-06-02

## 2025-06-02 NOTE — PROGRESS NOTES
Adonay Hamm is a 59 year old male.    HPI:     Chief Complaint   Patient presents with    Physical     ANNUAL PHYSICAL         58 y/o M here for physical exam; has been taking diazepam 5 mg po qAM and 10 mg po at bedtime for anxiety and insomnia; has been under stress over the last few years as daughter has had recurrent flares of mast cell activation syndrome; did not respond to sertraline 50 mg daily, so he stopped med; has been taking Celebrex 200 mg op every day, and occasionally, also taking Advil 800 mg po at bedtime on days with strenuous activity; also has small reserve of Norco 5 mg prn for strenuous activity due to arthralgias of knees;  no CP; no SOB; no headaches; no palpitation        HISTORY:  Past Medical History[1]   Past Surgical History[2]   Family History[3]   Social History: Short Social Hx on File[4]     Medications (Active prior to today's visit):  Current Medications[5]    Allergies:  Allergies[6]            ROS:   Constitutional: no weight loss; no fatigue  ENMT:  Negative for ear drainage, hearing loss and nasal drainage  Eyes:  Negative for eye discharge and vision loss  Cardiovascular:  Negative for chest pain; negative palpitations  Respiratory:  Negative for cough, dyspnea and wheezing  Endocrine:  Negative for abnormal sleep patterns, increased activity, polydipsia and polyphagia  Gastrointestinal:  Negative for abdominal pain, constipation, decreased appetite, diarrhea and vomiting; no melena or hematochezia  Musculoskeletal:  Negative for arthralgias or myalgias  Genitourinary:  Negative for dysuria or polyuria  Hema/Lymph:  Negative for easy bleeding and easy bruising  Integumentary:  Negative for pruritus and rash  Neurological:  Negative for gait disturbance; negative for paresthesias   All other review of systems are negative.        PHYSICAL EXAM:   Blood pressure 130/88, pulse 67, temperature 97.8 °F (36.6 °C), temperature source Oral, resp. rate 16, height 6' 4\" (1.93  m), weight 234 lb (106.1 kg), SpO2 98%.  Constitutional: alert and oriented x3 in no acute distress  HEENT- EOMI, PERRL  Nose/Mouth/Throat: pharynx without erythema; no oral lesions  Neck/Thyroid: neck supple; no thyromegaly  Lymphatics: no lymphadenopathy of neck or groin  Cardiovascular: RRR, S1, S2, no S3 or murmur  Respiratory: lungs without crackles or wheezes  Abdomen: normoactive bowel sounds, soft, non-tender and non-distended  Extremities: no clubbing, cyanosis or edema  Vascular: no carotid bruits; DP/PT 2/2  Musculoskeletal: Motor 5/5 upper and lower extremities  Neurological: cranial nerves II-XII intact; light touch and proprioception intact  Skin: no rash or ulcerations         ASSESSMENT/PLAN:   Physical exam  sees dermatologist Dr Reddy/ [facility] 3-4x/year for MARTHA; pt seen by GI Dr Carrillo Mancia for screening colonoscopy in April 2022; next colonoscopy due in 10 years, or April 2032; Tdap given Oct 2018; TSH borderline at 5.457, with free T4 normal at 1.2    Insomnia  No longer takes Ambien CR 12.5 mg po qHS prn;   -Had also tried taking Tylenol PM for insomnia, then had over-sedation, so stopped Tylenol PM  -weaned gabapentin 300 mg po TID x7d, 300 mg po BID x7d, then 300 mg po every day x 7d  then stopped  -responds to diazepam 5 mg po qAM and 10 mg po at bedtime; he understands that diazepam and Norco cannot be taken concurrently    lumbar spinal stenosis  S/p L3-4 and L4-5 laminectomy for resection of extradural cyst and subsequent L4-5 stabilization to address the mobile spondylolisthesis utilizing a right-sided transforaminal lumbar interbody fusion on 9/27/23 under care of NS Dr Orta;   -Creatinine 1.29 in June 2025;   -on Celebrex 200 mg po every day; also has Norco 5 mg po [id number] prn, though uses sparingly; refill #20    History of Right rotator cuff tear  S/p repair in Sept 2021 by orthopedics Dr Guerra     Anxiety  has been under stress over the last few years as daughter has had recurrent  flares of mast cell activation syndrome;  Tried sertraline x 4 months in 2024 without benefit, so stopped Rx; on diazepam 5 mg po TID prn     BPH (benign prostatic hypertrophy)  On Rapaflo 8 mg po qHS per  Dr Liang; refilled #90 3 RF     Osteoarthritis knees  Off meloxicam; F/U per Dr Leslie; pt receives visco-supplementation with marked benefit  -on Norco 5 mg po q4-6hours prn ; uses rarely; refilled #20     Allergic rhinitis  On Nasonex NS 2 sp EN qD    Hypercholesteremia   in June 2025; advise diet- control     Melanoma -in-situ  S/p excision to left lower back in Aug 2016; new melanoma-in-situ right upper back in May 2021 was removed; F/U dermatologist Dr Reddy/ [facility] q 3-4 mos; [facility]-mapping study     PACs  ECHO 55-60% in April 2016; saw EP Dr Dozier     RBBB  EKG shows incomplete RBBB; stress ECHO neg for ischemia in April 2016 and Nov 2017     RLS  did not respond to Mirapex      History of migraine headaches  Right retro-orbital headaches with +photsensitivity and +throbbing quality; suspect migraine variant; responding well to sumatriptan 50 mg po qD prn     PSA screening  PSA 0.40 in June 2025           Cell [phone]     Spent 30 minutes obtaining history, evaluating patient, discussing treatment options, diet, exercise, review of available labs and radiology reports, and completing documentation.             Orders This Visit:  No orders of the defined types were placed in this encounter.      Meds This Visit:  Requested Prescriptions     Signed Prescriptions Disp Refills    silodosin 8 MG Oral Cap 90 capsule 3     Sig: Take 1 capsule (8 mg total) by mouth daily.    diazePAM 5 MG Oral Tab 90 tablet 5     Sig: Take 1 tablet (5 mg total) by mouth every 8 (eight) hours as needed for Anxiety.    HYDROcodone-acetaminophen 5-325 MG Oral Tab 20 tablet 0     Sig: Take 1-2 tablets by mouth every 4 (four) hours as needed for Pain.       Imaging & Referrals:  None      6/2/2025  Brandon Lopez MD               [1]   Past Medical History:   Back problem    Cancer (HCC)    Cervical radiculopathy    Hypercholesterolemia    Lipid screening    Lumbar disc disease    Melanoma in situ of back (HCC)    Stage 0    Migraines    Unspecified essential hypertension   [2]   Past Surgical History:  Procedure Laterality Date    Colonoscopy  11/2016    Cortisone injection      both knee and right elbow. done at [facility]    Lasik      Shoulder arthroscopy Left 2011    rotator cuff surgery - Ortho Dr Alegre    [3]   Family History  Problem Relation Age of Onset    Cancer Mother 76        colon cancer    Alcohol and Other Disorders Associated Father     Cancer Father 75        Non-Hodgkins lymphoma   [4]   Social History  Socioeconomic History    Marital status:    Tobacco Use    Smoking status: Never     Passive exposure: Never    Smokeless tobacco: Never   Vaping Use    Vaping status: Never Used   Substance and Sexual Activity    Alcohol use: Yes     Comment: Very infrequent    Drug use: No   Other Topics Concern    Caffeine Concern No   [5]   Current Outpatient Medications   Medication Sig Dispense Refill    silodosin 8 MG Oral Cap Take 1 capsule (8 mg total) by mouth daily. 90 capsule 3    [START ON 6/15/2025] diazePAM 5 MG Oral Tab Take 1 tablet (5 mg total) by mouth every 8 (eight) hours as needed for Anxiety. 90 tablet 5    HYDROcodone-acetaminophen 5-325 MG Oral Tab Take 1-2 tablets by mouth every 4 (four) hours as needed for Pain. 20 tablet 0    celecoxib 200 MG Oral Cap TAKE 1 CAPSULE BY MOUTH EVERY DAY 90 capsule 3   [6] No Known Allergies

## 2025-07-07 ENCOUNTER — APPOINTMENT (OUTPATIENT)
Dept: URBAN - METROPOLITAN AREA CLINIC 244 | Age: 60
Setting detail: DERMATOLOGY
End: 2025-07-07

## 2025-07-07 DIAGNOSIS — D18.0 HEMANGIOMA: ICD-10-CM

## 2025-07-07 DIAGNOSIS — L82.1 OTHER SEBORRHEIC KERATOSIS: ICD-10-CM

## 2025-07-07 DIAGNOSIS — Z12.83 ENCOUNTER FOR SCREENING FOR MALIGNANT NEOPLASM OF SKIN: ICD-10-CM

## 2025-07-07 DIAGNOSIS — Z85.820 PERSONAL HISTORY OF MALIGNANT MELANOMA OF SKIN: ICD-10-CM

## 2025-07-07 DIAGNOSIS — Z87.2 PERSONAL HISTORY OF DISEASES OF THE SKIN AND SUBCUTANEOUS TISSUE: ICD-10-CM

## 2025-07-07 DIAGNOSIS — D22 MELANOCYTIC NEVI: ICD-10-CM

## 2025-07-07 DIAGNOSIS — L81.4 OTHER MELANIN HYPERPIGMENTATION: ICD-10-CM

## 2025-07-07 DIAGNOSIS — Z85.828 PERSONAL HISTORY OF OTHER MALIGNANT NEOPLASM OF SKIN: ICD-10-CM

## 2025-07-07 PROBLEM — D18.01 HEMANGIOMA OF SKIN AND SUBCUTANEOUS TISSUE: Status: ACTIVE | Noted: 2025-07-07

## 2025-07-07 PROBLEM — D22.9 MELANOCYTIC NEVI, UNSPECIFIED: Status: ACTIVE | Noted: 2025-07-07

## 2025-07-07 PROCEDURE — OTHER DIAGNOSIS COMMENT: OTHER

## 2025-07-07 PROCEDURE — OTHER COUNSELING: OTHER

## 2025-07-07 PROCEDURE — 99213 OFFICE O/P EST LOW 20 MIN: CPT

## 2025-07-07 PROCEDURE — OTHER MIPS QUALITY: OTHER

## 2025-07-07 ASSESSMENT — LOCATION DETAILED DESCRIPTION DERM
LOCATION DETAILED: LEFT SUPERIOR MEDIAL UPPER BACK
LOCATION DETAILED: RIGHT INFERIOR MEDIAL MIDBACK
LOCATION DETAILED: LEFT SUPERIOR MEDIAL MIDBACK
LOCATION DETAILED: RIGHT SUPERIOR LATERAL LOWER BACK
LOCATION DETAILED: RIGHT INFERIOR LATERAL MIDBACK
LOCATION DETAILED: RIGHT BUTTOCK
LOCATION DETAILED: SUPERIOR LUMBAR SPINE
LOCATION DETAILED: RIGHT PROXIMAL LATERAL POSTERIOR UPPER ARM
LOCATION DETAILED: LEFT INFERIOR CRUS OF ANTIHELIX
LOCATION DETAILED: LEFT INFERIOR MEDIAL UPPER BACK
LOCATION DETAILED: RIGHT INFERIOR MEDIAL UPPER BACK
LOCATION DETAILED: STERNUM
LOCATION DETAILED: RIGHT INFERIOR UPPER BACK
LOCATION DETAILED: LEFT SUPERIOR UPPER BACK
LOCATION DETAILED: PERIUMBILICAL SKIN
LOCATION DETAILED: RIGHT DISTAL RADIAL DORSAL FOREARM

## 2025-07-07 ASSESSMENT — LOCATION SIMPLE DESCRIPTION DERM
LOCATION SIMPLE: RIGHT UPPER BACK
LOCATION SIMPLE: RIGHT UPPER ARM
LOCATION SIMPLE: LEFT LOWER BACK
LOCATION SIMPLE: LOWER BACK
LOCATION SIMPLE: RIGHT BUTTOCK
LOCATION SIMPLE: RIGHT FOREARM
LOCATION SIMPLE: ABDOMEN
LOCATION SIMPLE: LEFT EAR
LOCATION SIMPLE: LEFT UPPER BACK
LOCATION SIMPLE: CHEST
LOCATION SIMPLE: RIGHT LOWER BACK

## 2025-07-07 ASSESSMENT — LOCATION ZONE DERM
LOCATION ZONE: TRUNK
LOCATION ZONE: EAR
LOCATION ZONE: ARM

## (undated) DIAGNOSIS — Z00.00 ANNUAL PHYSICAL EXAM: Primary | ICD-10-CM

## (undated) DIAGNOSIS — Z12.5 SCREENING PSA (PROSTATE SPECIFIC ANTIGEN): ICD-10-CM

## (undated) DIAGNOSIS — E78.00 HYPERCHOLESTEREMIA: ICD-10-CM

## (undated) DEVICE — Device

## (undated) DEVICE — SYSTEM DEL GRFT MOD MAS

## (undated) DEVICE — GAMMEX® PI HYBRID SIZE 6.5, STERILE POWDER-FREE SURGICAL GLOVE, POLYISOPRENE AND NEOPRENE BLEND: Brand: GAMMEX

## (undated) DEVICE — BIPOLAR SEALER 23-112-1 AQM 6.0: Brand: AQUAMANTYS™

## (undated) DEVICE — CONTAINER,SPECIMEN,OR STERILE,4OZ: Brand: MEDLINE

## (undated) DEVICE — INTENT TO BE USED WITH SUTURE MATERIAL FOR TISSUE CLOSURE: Brand: RICHARD-ALLAN® NEEDLE 3/8 CIRCLE TROCAR

## (undated) DEVICE — SHOULDER P.A.D II: Brand: DEROYAL

## (undated) DEVICE — PAD N ADH W3XL8IN POLY COT SFT PERF FLM ABSRB

## (undated) DEVICE — BIOPATCH™ ANTIMICROBIAL DRESSING WITH CHLORHEXIDINE GLUCONATE IS A HYDROPHILLIC POLYURETHANE ABSORPTIVE FOAM WITH CHLORHEXIDINE GLUCONATE (CHG) WHICH INHIBITS BACTERIAL GROWTH UNDER THE DRESSING. THE DRESSING IS INTENDED TO BE USED TO ABSORB EXUDATE, COVER A WOUND CAUSED BY VASCULAR AND NONVASCULAR PERCUTANEOUS MEDICAL DEVICES DURING SURGERY, AS WELL AS REDUCE LOCAL INFECTION AND COLONIZATION OF MICROORGANISMS.: Brand: BIOPATCH

## (undated) DEVICE — KIT SUR ACCS MAXCESS

## (undated) DEVICE — BURR SHVR COOLCUT 13CM 4MM 8

## (undated) DEVICE — SHOULDER: Brand: MEDLINE INDUSTRIES, INC.

## (undated) DEVICE — SUTURE VICRYL 2-0 FS-1

## (undated) DEVICE — ENCORE® LATEX MICRO SIZE 8, STERILE LATEX POWDER-FREE SURGICAL GLOVE: Brand: ENCORE

## (undated) DEVICE — 9534HP TRANSPARENT DRSG W/FRAME: Brand: 3M™ TEGADERM™

## (undated) DEVICE — GAMMEX® PI HYBRID SIZE 8, STERILE POWDER-FREE SURGICAL GLOVE, POLYISOPRENE AND NEOPRENE BLEND: Brand: GAMMEX

## (undated) DEVICE — ADHESIVE SKIN TOP FOR WND CLSR DERMBND ADV

## (undated) DEVICE — SOLUTION IRRIG 1000ML 0.9% NACL USP BTL

## (undated) DEVICE — KIT EVAC 400CC DIA1/8IN H PAT 12.5IN 3 SPR

## (undated) DEVICE — APPLICATOR SKIN PREP 26ML HI LT ORNG 2% CHG

## (undated) DEVICE — SUTURE NRLN SZ 4-0 L18IN NONABSORBABLE BLK .

## (undated) DEVICE — SOL  .9 1000ML BTL

## (undated) DEVICE — SUTURE MCRYL SZ 4-0 L18IN ABSRB UD L19MM PS-2

## (undated) DEVICE — SUTURE ETHILON 4-0 1667G

## (undated) DEVICE — LAMINECTOMY: Brand: MEDLINE INDUSTRIES, INC.

## (undated) DEVICE — SUTURE VCRL SZ 0 L18IN ABSRB VLT L26MM CT-2

## (undated) DEVICE — TRAY CATH 16FR F INCLUDE BARDX IC COMPLT CARE

## (undated) DEVICE — SUTURE PROL SZ 6-0 L30IN NONABSORBABLE BLU .

## (undated) DEVICE — FIRSTPASS ST SUTURE PASSER, SELF CAPTURE: Brand: FIRSTPASS

## (undated) DEVICE — SOLUTION IRRIG 1000ML ST H2O AQUALITE PLAS

## (undated) DEVICE — 20 ML SYRINGE LUER-LOCK TIP: Brand: MONOJECT

## (undated) DEVICE — PEN: MARKING STD PT 100/CS: Brand: MEDICAL ACTION INDUSTRIES

## (undated) DEVICE — SUTURE PDS II 3-0 Z683G

## (undated) DEVICE — SUTURE VICRYL 0 CP-1

## (undated) DEVICE — TRAY INSTR PWR NUVASIVE

## (undated) DEVICE — NEEDLE NRV STIM BVL TIP INSUL PEDCL ACCS SYS

## (undated) DEVICE — SPK10329 JACKSON KIT: Brand: SPK10329 JACKSON KIT

## (undated) DEVICE — DRAPE SHEET LG

## (undated) DEVICE — SOLUTION SURG DURA PREP HAZMAT

## (undated) DEVICE — 3M™ STERI-STRIP™ REINFORCED ADHESIVE SKIN CLOSURES, R1547, 1/2 IN X 4 IN (12 MM X 100 MM), 6 STRIPS/ENVELOPE: Brand: 3M™ STERI-STRIP™

## (undated) DEVICE — C-ARMOR C-ARM EQUIPMENT COVERS CLEAR STERILE UNIVERSAL FIT 12 PER CASE: Brand: C-ARMOR

## (undated) DEVICE — SUCTION CANISTER, 3000CC,SAFELINER: Brand: DEROYAL

## (undated) DEVICE — ELECTRODE ES L16.5CM BLDE MPLR OPN APPRCH EZ

## (undated) DEVICE — 3M™ STERI-DRAPE™ U-DRAPE 1015: Brand: STERI-DRAPE™

## (undated) DEVICE — SNAP KOVER: Brand: UNBRANDED

## (undated) DEVICE — GAMMEX® PI HYBRID SIZE 7.5, STERILE POWDER-FREE SURGICAL GLOVE, POLYISOPRENE AND NEOPRENE BLEND: Brand: GAMMEX

## (undated) DEVICE — DRAPE SRG 90X60IN BCK TBL CVR

## (undated) DEVICE — CANNULA 7MM W/LIP AR-6550

## (undated) DEVICE — SOLUTION IV 1000ML 0.9% NACL PRESERVATIVE

## (undated) DEVICE — WRAP COOLING BACK W/NO PILLOW

## (undated) DEVICE — SUTURE ETHIBOND 1 CT-1

## (undated) DEVICE — KIT HEMSTAT MTRX 8ML PORCINE GEL HUM THROM

## (undated) DEVICE — PRECISION (9.0 X 0.51 X 31.0MM)

## (undated) DEVICE — K WIRE FIX NIT BVL BLNT TIP PRECEPT

## (undated) DEVICE — SUTURE VCRL SZ 3-0 L18IN ABSRB UD CP-2 L26MM

## (undated) DEVICE — 3M™ TEGADERM™ TRANSPARENT FILM DRESSING, 1626W, 4 IN X 4-3/4 IN (10 CM X 12 CM), 50 EACH/CARTON, 4 CARTON/CASE: Brand: 3M™ TEGADERM™

## (undated) DEVICE — DISPOSABLE SLIM BIPOLAR FORCEPS, NON-STICK,: Brand: SPETZLER-MALIS

## (undated) DEVICE — SUTURE MONOCRYL 4-0 Y845G

## (undated) DEVICE — PRECISION MATCH HEAD

## (undated) DEVICE — T-MAX DISPOSABLE FACE MASK 8 PER BOX

## (undated) DEVICE — PENCIL ES BTTN SWCH W/ TIP HOLSTER E-Z CLN

## (undated) DEVICE — C-ARM: Brand: UNBRANDED

## (undated) DEVICE — ENCORE® LATEX MICRO SIZE 7.5, STERILE LATEX POWDER-FREE SURGICAL GLOVE: Brand: ENCORE